# Patient Record
Sex: FEMALE | Race: WHITE | NOT HISPANIC OR LATINO | Employment: UNEMPLOYED
[De-identification: names, ages, dates, MRNs, and addresses within clinical notes are randomized per-mention and may not be internally consistent; named-entity substitution may affect disease eponyms.]

---

## 2017-02-17 ENCOUNTER — HOSPITAL (OUTPATIENT)
Dept: OTHER | Age: 37
End: 2017-02-17
Attending: INTERNAL MEDICINE

## 2017-10-24 ENCOUNTER — PRIOR ORIGINAL RECORDS (OUTPATIENT)
Dept: OTHER | Age: 37
End: 2017-10-24

## 2017-12-31 ENCOUNTER — PRIOR ORIGINAL RECORDS (OUTPATIENT)
Dept: OTHER | Age: 37
End: 2017-12-31

## 2018-03-08 ENCOUNTER — HOSPITAL (OUTPATIENT)
Dept: OTHER | Age: 38
End: 2018-03-08
Attending: INTERNAL MEDICINE

## 2018-03-09 ENCOUNTER — DIAGNOSTIC TRANS (OUTPATIENT)
Dept: OTHER | Age: 38
End: 2018-03-09

## 2018-03-23 ENCOUNTER — HOSPITAL (OUTPATIENT)
Dept: OTHER | Age: 38
End: 2018-03-23
Attending: INTERNAL MEDICINE

## 2018-03-28 ENCOUNTER — HOSPITAL (OUTPATIENT)
Dept: OTHER | Age: 38
End: 2018-03-28
Attending: INTERNAL MEDICINE

## 2018-05-31 ENCOUNTER — HOSPITAL (OUTPATIENT)
Dept: OTHER | Age: 38
End: 2018-05-31
Attending: INTERNAL MEDICINE

## 2018-06-28 ENCOUNTER — HOSPITAL (OUTPATIENT)
Dept: OTHER | Age: 38
End: 2018-06-28
Attending: INTERNAL MEDICINE

## 2018-11-13 ENCOUNTER — IMAGING SERVICES (OUTPATIENT)
Dept: OTHER | Age: 38
End: 2018-11-13

## 2018-11-13 ENCOUNTER — HOSPITAL (OUTPATIENT)
Dept: OTHER | Age: 38
End: 2018-11-13
Attending: INTERNAL MEDICINE

## 2018-12-31 ENCOUNTER — PRIOR ORIGINAL RECORDS (OUTPATIENT)
Dept: OTHER | Age: 38
End: 2018-12-31

## 2019-01-07 ENCOUNTER — HOSPITAL (OUTPATIENT)
Dept: OTHER | Age: 39
End: 2019-01-07
Attending: INTERNAL MEDICINE

## 2019-11-13 ENCOUNTER — HOSPITAL (OUTPATIENT)
Dept: OTHER | Age: 39
End: 2019-11-13
Attending: INTERNAL MEDICINE

## 2020-10-11 VITALS
WEIGHT: 144.49 LBS | DIASTOLIC BLOOD PRESSURE: 82 MMHG | BODY MASS INDEX: 24.07 KG/M2 | SYSTOLIC BLOOD PRESSURE: 126 MMHG | HEIGHT: 65 IN

## 2020-12-10 PROCEDURE — 87624 HPV HI-RISK TYP POOLED RSLT: CPT | Performed by: CLINICAL MEDICAL LABORATORY

## 2020-12-10 PROCEDURE — 88142 CYTOPATH C/V THIN LAYER: CPT | Performed by: CLINICAL MEDICAL LABORATORY

## 2020-12-11 ENCOUNTER — LAB REQUISITION (OUTPATIENT)
Dept: LAB | Age: 40
End: 2020-12-11

## 2020-12-11 DIAGNOSIS — Z12.4 ENCOUNTER FOR SCREENING FOR MALIGNANT NEOPLASM OF CERVIX: ICD-10-CM

## 2020-12-16 LAB
CASE RPRT: NORMAL
CYTOLOGY CVX/VAG STUDY: NORMAL
HPV16+18+45 E6+E7MRNA CVX NAA+PROBE: NEGATIVE
Lab: NORMAL
PAP EDUCATIONAL NOTE: NORMAL
SPECIMEN ADEQUACY: NORMAL

## 2020-12-17 ENCOUNTER — IMMUNIZATION (OUTPATIENT)
Dept: LAB | Age: 40
End: 2020-12-17

## 2020-12-17 DIAGNOSIS — Z23 NEED FOR VACCINATION: Primary | ICD-10-CM

## 2020-12-17 PROCEDURE — 0001A COVID 19 PFIZER-BIONTECH: CPT

## 2020-12-17 PROCEDURE — 91300 COVID 19 PFIZER-BIONTECH: CPT

## 2021-01-01 ENCOUNTER — EXTERNAL RECORD (OUTPATIENT)
Dept: INFUSION THERAPY | Age: 41
End: 2021-01-01

## 2021-01-06 ENCOUNTER — IMMUNIZATION (OUTPATIENT)
Dept: LAB | Age: 41
End: 2021-01-06

## 2021-01-06 DIAGNOSIS — Z23 NEED FOR VACCINATION: Primary | ICD-10-CM

## 2021-01-06 PROCEDURE — 0002A COVID 19 PFIZER-BIONTECH: CPT

## 2021-01-06 PROCEDURE — 91300 COVID 19 PFIZER-BIONTECH: CPT

## 2021-02-08 ENCOUNTER — HOSPITAL ENCOUNTER (OUTPATIENT)
Dept: LAB | Age: 41
Discharge: HOME | End: 2021-02-08

## 2021-02-08 ENCOUNTER — APPOINTMENT (OUTPATIENT)
Dept: LAB | Age: 41
End: 2021-02-08
Attending: INTERNAL MEDICINE

## 2021-02-08 DIAGNOSIS — D72.824 BASOPHILIA: ICD-10-CM

## 2021-02-08 DIAGNOSIS — B99.8 IMMUNOSUPPRESSION-RELATED INFECTIOUS DISEASE (CMD): Primary | ICD-10-CM

## 2021-02-08 DIAGNOSIS — Z57.9 OCCUPATIONAL EXPOSURE TO RISK FACTOR: ICD-10-CM

## 2021-02-08 DIAGNOSIS — D84.9 IMMUNOSUPPRESSION-RELATED INFECTIOUS DISEASE (CMD): Primary | ICD-10-CM

## 2021-02-08 DIAGNOSIS — E88.09 DYSPROTEINEMIA: ICD-10-CM

## 2021-02-08 DIAGNOSIS — E53.8 B12 DEFICIENCY: ICD-10-CM

## 2021-02-08 DIAGNOSIS — C92.10 CHRONIC MYELOID LEUKEMIA (CMD): ICD-10-CM

## 2021-02-08 LAB
ALBUMIN SERPL-MCNC: 4.1 G/DL (ref 3.6–5.1)
ALBUMIN/GLOB SERPL: 1.3 {RATIO} (ref 1–2.4)
ALP SERPL-CCNC: 54 UNITS/L (ref 45–117)
ALT SERPL-CCNC: 23 UNITS/L
ANION GAP SERPL CALC-SCNC: 8 MMOL/L (ref 10–20)
AST SERPL-CCNC: 21 UNITS/L
BASOPHILS # BLD: 0 K/MCL (ref 0–0.3)
BASOPHILS NFR BLD: 1 %
BILIRUB SERPL-MCNC: 0.4 MG/DL (ref 0.2–1)
BUN SERPL-MCNC: 11 MG/DL (ref 6–20)
BUN/CREAT SERPL: 19 (ref 7–25)
CALCIUM SERPL-MCNC: 8.5 MG/DL (ref 8.4–10.2)
CHLORIDE SERPL-SCNC: 110 MMOL/L (ref 98–107)
CO2 SERPL-SCNC: 27 MMOL/L (ref 21–32)
CREAT SERPL-MCNC: 0.59 MG/DL (ref 0.51–0.95)
DEPRECATED RDW RBC: 45.2 FL (ref 39–50)
EOSINOPHIL # BLD: 0.1 K/MCL (ref 0–0.5)
EOSINOPHIL NFR BLD: 3 %
ERYTHROCYTE [DISTWIDTH] IN BLOOD: 13 % (ref 11–15)
FASTING DURATION TIME PATIENT: 12 HOURS
GFR SERPLBLD BASED ON 1.73 SQ M-ARVRAT: >90 ML/MIN/1.73M2
GLOBULIN SER-MCNC: 3.2 G/DL (ref 2–4)
GLUCOSE SERPL-MCNC: 75 MG/DL (ref 65–99)
HCT VFR BLD CALC: 36.1 % (ref 36–46.5)
HGB BLD-MCNC: 11.9 G/DL (ref 12–15.5)
IMM GRANULOCYTES # BLD AUTO: 0 K/MCL (ref 0–0.2)
IMM GRANULOCYTES # BLD: 0 %
LYMPHOCYTES # BLD: 1.1 K/MCL (ref 1–4.8)
LYMPHOCYTES NFR BLD: 29 %
MCH RBC QN AUTO: 31 PG (ref 26–34)
MCHC RBC AUTO-ENTMCNC: 33 G/DL (ref 32–36.5)
MCV RBC AUTO: 94 FL (ref 78–100)
MONOCYTES # BLD: 0.3 K/MCL (ref 0.3–0.9)
MONOCYTES NFR BLD: 7 %
NEUTROPHILS # BLD: 2.4 K/MCL (ref 1.8–7.7)
NEUTROPHILS NFR BLD: 60 %
NRBC BLD MANUAL-RTO: 0 /100 WBC
PLATELET # BLD AUTO: 208 K/MCL (ref 140–450)
POTASSIUM SERPL-SCNC: 3.9 MMOL/L (ref 3.4–5.1)
PROT SERPL-MCNC: 7.3 G/DL (ref 6.4–8.2)
RBC # BLD: 3.84 MIL/MCL (ref 4–5.2)
SARS-COV-2 IGG SERPL QL IA: NEGATIVE
SARS-COV-2 N IGG SERPL QL IA: 0.02
SODIUM SERPL-SCNC: 141 MMOL/L (ref 135–145)
VIT B12 SERPL-MCNC: 862 PG/ML (ref 211–911)
WBC # BLD: 4 K/MCL (ref 4.2–11)

## 2021-02-08 PROCEDURE — 36415 COLL VENOUS BLD VENIPUNCTURE: CPT

## 2021-02-08 PROCEDURE — 86769 SARS-COV-2 COVID-19 ANTIBODY: CPT

## 2021-02-08 PROCEDURE — 82607 VITAMIN B-12: CPT

## 2021-02-08 PROCEDURE — 81206 BCR/ABL1 GENE MAJOR BP: CPT

## 2021-02-08 PROCEDURE — 85025 COMPLETE CBC W/AUTO DIFF WBC: CPT

## 2021-02-08 PROCEDURE — 83520 IMMUNOASSAY QUANT NOS NONAB: CPT

## 2021-02-08 PROCEDURE — 80053 COMPREHEN METABOLIC PANEL: CPT

## 2021-02-08 SDOH — HEALTH STABILITY - PHYSICAL HEALTH: OCCUPATIONAL EXPOSURE TO UNSPECIFIED RISK FACTOR: Z57.9

## 2021-02-09 LAB
BCR-ABL1, LOG REDUCTION - NUMERIC: 4.11
IGA SERPL-MCNC: 125 MG/DL (ref 82–453)
IGG SERPL-MCNC: 878 MG/DL (ref 751–1560)
IGM SERPL-MCNC: 72 MG/DL (ref 46–304)
KAPPA LC FREE SER NEPH-MCNC: 1.25 MG/DL (ref 0.33–1.94)
KAPPA LC/LAMBDA SER: 1.23 {RATIO} (ref 0.26–1.65)
LAMBDA LC FREE SER NEPH-MCNC: 1.02 MG/DL (ref 0.57–2.63)
PATH INTERP BLD-IMP: NORMAL
SERVICE CMNT-IMP: ABNORMAL
T(ABL1,BCR)P210 BLD/T QL: POSITIVE
T(ABL1,BCR)P210/CONTROL (IS) BLD/T: 0.01 %

## 2021-03-03 ENCOUNTER — OFFICE VISIT (OUTPATIENT)
Dept: HEMATOLOGY/ONCOLOGY | Age: 41
End: 2021-03-03
Attending: INTERNAL MEDICINE

## 2021-03-03 VITALS
SYSTOLIC BLOOD PRESSURE: 117 MMHG | OXYGEN SATURATION: 100 % | HEIGHT: 68 IN | HEART RATE: 76 BPM | BODY MASS INDEX: 20.34 KG/M2 | DIASTOLIC BLOOD PRESSURE: 74 MMHG | WEIGHT: 134.2 LBS

## 2021-03-03 DIAGNOSIS — D53.9 NUTRITIONAL ANEMIA, UNSPECIFIED: ICD-10-CM

## 2021-03-03 DIAGNOSIS — M04.1 FAMILIAL MEDITERRANEAN FEVER (CMD): ICD-10-CM

## 2021-03-03 DIAGNOSIS — C92.11 CML IN REMISSION (CMD): Primary | ICD-10-CM

## 2021-03-03 PROCEDURE — 99205 OFFICE O/P NEW HI 60 MIN: CPT | Performed by: INTERNAL MEDICINE

## 2021-03-03 SDOH — HEALTH STABILITY: PHYSICAL HEALTH: ON AVERAGE, HOW MANY DAYS PER WEEK DO YOU ENGAGE IN MODERATE TO STRENUOUS EXERCISE (LIKE A BRISK WALK)?: 0 DAYS

## 2021-03-03 SDOH — HEALTH STABILITY: PHYSICAL HEALTH: ON AVERAGE, HOW MANY MINUTES DO YOU ENGAGE IN EXERCISE AT THIS LEVEL?: 0 MIN

## 2021-03-03 ASSESSMENT — ENCOUNTER SYMPTOMS
BLOOD IN STOOL: 0
CONFUSION: 0
FEVER: 1
ADENOPATHY: 0
DIARRHEA: 0
WEAKNESS: 1
BRUISES/BLEEDS EASILY: 0
CHILLS: 0
NAUSEA: 0
UNEXPECTED WEIGHT CHANGE: 0
APPETITE CHANGE: 0
TROUBLE SWALLOWING: 0
APNEA: 0
VOICE CHANGE: 0
ACTIVITY CHANGE: 1
ABDOMINAL DISTENTION: 0
ABDOMINAL PAIN: 0
SHORTNESS OF BREATH: 0
LIGHT-HEADEDNESS: 0
DIZZINESS: 0
COUGH: 0
CONSTIPATION: 0
DIAPHORESIS: 0
CHOKING: 0
SLEEP DISTURBANCE: 0
CHEST TIGHTNESS: 0
WHEEZING: 0
VOMITING: 0
BACK PAIN: 1
SPEECH DIFFICULTY: 0
FATIGUE: 1
HEADACHES: 0

## 2021-03-03 ASSESSMENT — PATIENT HEALTH QUESTIONNAIRE - PHQ9
SUM OF ALL RESPONSES TO PHQ9 QUESTIONS 1 AND 2: 0
SUM OF ALL RESPONSES TO PHQ9 QUESTIONS 1 AND 2: 0
2. FEELING DOWN, DEPRESSED OR HOPELESS: NOT AT ALL
CLINICAL INTERPRETATION OF PHQ9 SCORE: NO FURTHER SCREENING NEEDED
CLINICAL INTERPRETATION OF PHQ2 SCORE: NO FURTHER SCREENING NEEDED
1. LITTLE INTEREST OR PLEASURE IN DOING THINGS: NOT AT ALL

## 2021-03-04 ENCOUNTER — HOSPITAL ENCOUNTER (OUTPATIENT)
Dept: LAB | Age: 41
Discharge: HOME | End: 2021-03-04
Attending: INTERNAL MEDICINE

## 2021-03-04 DIAGNOSIS — C92.11 CML IN REMISSION (CMD): ICD-10-CM

## 2021-03-04 DIAGNOSIS — D53.9 NUTRITIONAL ANEMIA, UNSPECIFIED: ICD-10-CM

## 2021-03-04 DIAGNOSIS — M04.1 FAMILIAL MEDITERRANEAN FEVER (CMD): ICD-10-CM

## 2021-03-04 LAB
ALBUMIN SERPL-MCNC: 4.1 G/DL (ref 3.6–5.1)
ALBUMIN/GLOB SERPL: 1.2 {RATIO} (ref 1–2.4)
ALP SERPL-CCNC: 52 UNITS/L (ref 45–117)
ALT SERPL-CCNC: 27 UNITS/L
ANION GAP SERPL CALC-SCNC: 8 MMOL/L (ref 10–20)
AST SERPL-CCNC: 20 UNITS/L
BASOPHILS # BLD: 0 K/MCL (ref 0–0.3)
BASOPHILS NFR BLD: 1 %
BILIRUB SERPL-MCNC: 0.3 MG/DL (ref 0.2–1)
BUN SERPL-MCNC: 12 MG/DL (ref 6–20)
BUN/CREAT SERPL: 15 (ref 7–25)
CALCIUM SERPL-MCNC: 9 MG/DL (ref 8.4–10.2)
CHLORIDE SERPL-SCNC: 110 MMOL/L (ref 98–107)
CO2 SERPL-SCNC: 25 MMOL/L (ref 21–32)
CREAT SERPL-MCNC: 0.81 MG/DL (ref 0.51–0.95)
DEPRECATED RDW RBC: 44.5 FL (ref 39–50)
EOSINOPHIL # BLD: 0.1 K/MCL (ref 0–0.5)
EOSINOPHIL NFR BLD: 1 %
ERYTHROCYTE [DISTWIDTH] IN BLOOD: 12.9 % (ref 11–15)
ERYTHROCYTE [SEDIMENTATION RATE] IN BLOOD BY WESTERGREN METHOD: 8 MM/HR (ref 0–20)
FASTING DURATION TIME PATIENT: ABNORMAL H
FERRITIN SERPL-MCNC: 26 NG/ML (ref 8–252)
FOLATE SERPL-MCNC: 8.8 NG/ML
GFR SERPLBLD BASED ON 1.73 SQ M-ARVRAT: 90 ML/MIN/1.73M2
GLOBULIN SER-MCNC: 3.3 G/DL (ref 2–4)
GLUCOSE SERPL-MCNC: 94 MG/DL (ref 65–99)
HCT VFR BLD CALC: 34.8 % (ref 36–46.5)
HGB BLD-MCNC: 11.7 G/DL (ref 12–15.5)
IMM GRANULOCYTES # BLD AUTO: 0 K/MCL (ref 0–0.2)
IMM GRANULOCYTES # BLD: 0 %
IRON SATN MFR SERPL: 19 % (ref 15–45)
IRON SERPL-MCNC: 66 MCG/DL (ref 50–170)
LYMPHOCYTES # BLD: 1.2 K/MCL (ref 1–4.8)
LYMPHOCYTES NFR BLD: 28 %
MCH RBC QN AUTO: 31.3 PG (ref 26–34)
MCHC RBC AUTO-ENTMCNC: 33.6 G/DL (ref 32–36.5)
MCV RBC AUTO: 93 FL (ref 78–100)
MONOCYTES # BLD: 0.3 K/MCL (ref 0.3–0.9)
MONOCYTES NFR BLD: 6 %
NEUTROPHILS # BLD: 2.8 K/MCL (ref 1.8–7.7)
NEUTROPHILS NFR BLD: 64 %
NRBC BLD MANUAL-RTO: 0 /100 WBC
PLATELET # BLD AUTO: 196 K/MCL (ref 140–450)
POTASSIUM SERPL-SCNC: 3.7 MMOL/L (ref 3.4–5.1)
PROT SERPL-MCNC: 7.4 G/DL (ref 6.4–8.2)
RBC # BLD: 3.74 MIL/MCL (ref 4–5.2)
SODIUM SERPL-SCNC: 139 MMOL/L (ref 135–145)
TIBC SERPL-MCNC: 340 MCG/DL (ref 250–450)
VIT B12 SERPL-MCNC: 935 PG/ML (ref 211–911)
WBC # BLD: 4.4 K/MCL (ref 4.2–11)

## 2021-03-04 PROCEDURE — 83540 ASSAY OF IRON: CPT | Performed by: INTERNAL MEDICINE

## 2021-03-04 PROCEDURE — 82728 ASSAY OF FERRITIN: CPT | Performed by: INTERNAL MEDICINE

## 2021-03-04 PROCEDURE — 82746 ASSAY OF FOLIC ACID SERUM: CPT | Performed by: INTERNAL MEDICINE

## 2021-03-04 PROCEDURE — 36415 COLL VENOUS BLD VENIPUNCTURE: CPT | Performed by: INTERNAL MEDICINE

## 2021-03-04 PROCEDURE — 85652 RBC SED RATE AUTOMATED: CPT | Performed by: INTERNAL MEDICINE

## 2021-03-04 PROCEDURE — 80053 COMPREHEN METABOLIC PANEL: CPT | Performed by: INTERNAL MEDICINE

## 2021-03-04 PROCEDURE — 85025 COMPLETE CBC W/AUTO DIFF WBC: CPT | Performed by: INTERNAL MEDICINE

## 2021-03-04 PROCEDURE — 81206 BCR/ABL1 GENE MAJOR BP: CPT | Performed by: INTERNAL MEDICINE

## 2021-03-10 LAB
BCR-ABL1, LOG REDUCTION - NUMERIC: 3.64
SERVICE CMNT-IMP: ABNORMAL
T(ABL1,BCR)P210 BLD/T QL: POSITIVE
T(ABL1,BCR)P210/CONTROL (IS) BLD/T: 0.02 %

## 2021-04-03 ENCOUNTER — TELEPHONE (OUTPATIENT)
Dept: RHEUMATOLOGY | Age: 41
End: 2021-04-03

## 2021-04-05 ENCOUNTER — TELEPHONE (OUTPATIENT)
Dept: RHEUMATOLOGY | Age: 41
End: 2021-04-05

## 2021-04-08 ENCOUNTER — APPOINTMENT (OUTPATIENT)
Dept: RHEUMATOLOGY | Age: 41
End: 2021-04-08

## 2021-04-09 ENCOUNTER — OFFICE VISIT (OUTPATIENT)
Dept: HEMATOLOGY/ONCOLOGY | Age: 41
End: 2021-04-09
Attending: INTERNAL MEDICINE

## 2021-04-09 ENCOUNTER — HOSPITAL ENCOUNTER (OUTPATIENT)
Dept: LAB | Age: 41
Discharge: STILL ADMITTED OR ADMINISTRATIVE | End: 2021-04-09
Attending: INTERNAL MEDICINE

## 2021-04-09 VITALS
HEIGHT: 67 IN | SYSTOLIC BLOOD PRESSURE: 106 MMHG | RESPIRATION RATE: 18 BRPM | HEART RATE: 73 BPM | BODY MASS INDEX: 20.88 KG/M2 | WEIGHT: 133 LBS | DIASTOLIC BLOOD PRESSURE: 57 MMHG | OXYGEN SATURATION: 100 % | TEMPERATURE: 98 F

## 2021-04-09 DIAGNOSIS — D53.9 NUTRITIONAL ANEMIA, UNSPECIFIED: ICD-10-CM

## 2021-04-09 DIAGNOSIS — M04.1 FAMILIAL MEDITERRANEAN FEVER (CMD): ICD-10-CM

## 2021-04-09 DIAGNOSIS — C92.11 CML IN REMISSION (CMD): Primary | ICD-10-CM

## 2021-04-09 DIAGNOSIS — C92.11 CML IN REMISSION (CMD): ICD-10-CM

## 2021-04-09 LAB
ALBUMIN SERPL-MCNC: 4 G/DL (ref 3.6–5.1)
ALBUMIN/GLOB SERPL: 1.3 {RATIO} (ref 1–2.4)
ALP SERPL-CCNC: 52 UNITS/L (ref 45–117)
ALT SERPL-CCNC: 27 UNITS/L
ANION GAP SERPL CALC-SCNC: 7 MMOL/L (ref 10–20)
AST SERPL-CCNC: 23 UNITS/L
BASOPHILS # BLD: 0 K/MCL (ref 0–0.3)
BASOPHILS NFR BLD: 0 %
BILIRUB SERPL-MCNC: 0.4 MG/DL (ref 0.2–1)
BUN SERPL-MCNC: 14 MG/DL (ref 6–20)
BUN/CREAT SERPL: 23 (ref 7–25)
CALCIUM SERPL-MCNC: 8.5 MG/DL (ref 8.4–10.2)
CHLORIDE SERPL-SCNC: 110 MMOL/L (ref 98–107)
CO2 SERPL-SCNC: 27 MMOL/L (ref 21–32)
CREAT SERPL-MCNC: 0.61 MG/DL (ref 0.51–0.95)
DEPRECATED RDW RBC: 45.3 FL (ref 39–50)
EOSINOPHIL # BLD: 0.1 K/MCL (ref 0–0.5)
EOSINOPHIL NFR BLD: 2 %
ERYTHROCYTE [DISTWIDTH] IN BLOOD: 13.1 % (ref 11–15)
FASTING DURATION TIME PATIENT: ABNORMAL H
GFR SERPLBLD BASED ON 1.73 SQ M-ARVRAT: >90 ML/MIN/1.73M2
GLOBULIN SER-MCNC: 3 G/DL (ref 2–4)
GLUCOSE SERPL-MCNC: 84 MG/DL (ref 65–99)
HCT VFR BLD CALC: 33.3 % (ref 36–46.5)
HGB BLD-MCNC: 10.9 G/DL (ref 12–15.5)
IMM GRANULOCYTES # BLD AUTO: 0 K/MCL (ref 0–0.2)
IMM GRANULOCYTES # BLD: 0 %
LYMPHOCYTES # BLD: 1 K/MCL (ref 1–4.8)
LYMPHOCYTES NFR BLD: 25 %
MCH RBC QN AUTO: 30.7 PG (ref 26–34)
MCHC RBC AUTO-ENTMCNC: 32.7 G/DL (ref 32–36.5)
MCV RBC AUTO: 93.8 FL (ref 78–100)
MONOCYTES # BLD: 0.3 K/MCL (ref 0.3–0.9)
MONOCYTES NFR BLD: 7 %
NEUTROPHILS # BLD: 2.5 K/MCL (ref 1.8–7.7)
NEUTROPHILS NFR BLD: 66 %
NRBC BLD MANUAL-RTO: 0 /100 WBC
PLATELET # BLD AUTO: 211 K/MCL (ref 140–450)
POTASSIUM SERPL-SCNC: 3.8 MMOL/L (ref 3.4–5.1)
PROT SERPL-MCNC: 7 G/DL (ref 6.4–8.2)
RAINBOW EXTRA TUBES HOLD SPECIMEN: NORMAL
RBC # BLD: 3.55 MIL/MCL (ref 4–5.2)
SODIUM SERPL-SCNC: 140 MMOL/L (ref 135–145)
WBC # BLD: 3.9 K/MCL (ref 4.2–11)

## 2021-04-09 PROCEDURE — 99214 OFFICE O/P EST MOD 30 MIN: CPT | Performed by: INTERNAL MEDICINE

## 2021-04-09 PROCEDURE — 80053 COMPREHEN METABOLIC PANEL: CPT | Performed by: INTERNAL MEDICINE

## 2021-04-09 PROCEDURE — 36415 COLL VENOUS BLD VENIPUNCTURE: CPT | Performed by: INTERNAL MEDICINE

## 2021-04-09 PROCEDURE — 81206 BCR/ABL1 GENE MAJOR BP: CPT | Performed by: INTERNAL MEDICINE

## 2021-04-09 PROCEDURE — 85025 COMPLETE CBC W/AUTO DIFF WBC: CPT | Performed by: INTERNAL MEDICINE

## 2021-04-09 ASSESSMENT — ENCOUNTER SYMPTOMS
HEADACHES: 0
FATIGUE: 0
ABDOMINAL PAIN: 0
CONFUSION: 0
CHILLS: 0
SPEECH DIFFICULTY: 0
ADENOPATHY: 0
COUGH: 0
VOICE CHANGE: 0
SLEEP DISTURBANCE: 0
ACTIVITY CHANGE: 0
BRUISES/BLEEDS EASILY: 0
WHEEZING: 0
ABDOMINAL DISTENTION: 0
APPETITE CHANGE: 0
CONSTIPATION: 0
VOMITING: 0
TROUBLE SWALLOWING: 0
FEVER: 0
LIGHT-HEADEDNESS: 0
BLOOD IN STOOL: 0
CHEST TIGHTNESS: 0
APNEA: 0
BACK PAIN: 0
CHOKING: 0
UNEXPECTED WEIGHT CHANGE: 0
DIARRHEA: 0
DIAPHORESIS: 0
WEAKNESS: 0
DIZZINESS: 0
SHORTNESS OF BREATH: 0
NAUSEA: 0

## 2021-04-09 ASSESSMENT — PATIENT HEALTH QUESTIONNAIRE - PHQ9
SUM OF ALL RESPONSES TO PHQ9 QUESTIONS 1 AND 2: 0
SUM OF ALL RESPONSES TO PHQ9 QUESTIONS 1 AND 2: 0
1. LITTLE INTEREST OR PLEASURE IN DOING THINGS: NOT AT ALL
2. FEELING DOWN, DEPRESSED OR HOPELESS: NOT AT ALL
CLINICAL INTERPRETATION OF PHQ9 SCORE: NO FURTHER SCREENING NEEDED
CLINICAL INTERPRETATION OF PHQ2 SCORE: NO FURTHER SCREENING NEEDED

## 2021-04-12 LAB
BCR-ABL1, LOG REDUCTION - NUMERIC: 3.79
SERVICE CMNT-IMP: ABNORMAL
T(ABL1,BCR)P210 BLD/T QL: POSITIVE
T(ABL1,BCR)P210/CONTROL (IS) BLD/T: 0.02 %

## 2021-05-06 ENCOUNTER — TELEPHONE (OUTPATIENT)
Dept: HEMATOLOGY/ONCOLOGY | Age: 41
End: 2021-05-06

## 2021-05-06 DIAGNOSIS — C92.11 CML IN REMISSION (CMD): Primary | ICD-10-CM

## 2021-05-06 RX ORDER — IMATINIB MESYLATE 400 MG/1
400 TABLET, FILM COATED ORAL DAILY
Qty: 30 TABLET | Refills: 1 | Status: SHIPPED | OUTPATIENT
Start: 2021-05-06 | End: 2021-07-06

## 2021-05-10 ENCOUNTER — HOSPITAL ENCOUNTER (OUTPATIENT)
Dept: LAB | Age: 41
Discharge: STILL ADMITTED OR ADMINISTRATIVE | End: 2021-05-10
Attending: INTERNAL MEDICINE

## 2021-05-10 ENCOUNTER — OFFICE VISIT (OUTPATIENT)
Dept: HEMATOLOGY/ONCOLOGY | Age: 41
End: 2021-05-10
Attending: INTERNAL MEDICINE

## 2021-05-10 VITALS
HEIGHT: 67 IN | TEMPERATURE: 98 F | BODY MASS INDEX: 20.56 KG/M2 | DIASTOLIC BLOOD PRESSURE: 65 MMHG | RESPIRATION RATE: 18 BRPM | WEIGHT: 131 LBS | HEART RATE: 74 BPM | SYSTOLIC BLOOD PRESSURE: 111 MMHG | OXYGEN SATURATION: 100 %

## 2021-05-10 DIAGNOSIS — C92.11 CML IN REMISSION (CMD): Primary | ICD-10-CM

## 2021-05-10 DIAGNOSIS — C92.11 CML IN REMISSION (CMD): ICD-10-CM

## 2021-05-10 DIAGNOSIS — M25.552 ACUTE HIP PAIN, LEFT: ICD-10-CM

## 2021-05-10 LAB
ALBUMIN SERPL-MCNC: 4.2 G/DL (ref 3.6–5.1)
ALBUMIN/GLOB SERPL: 1.4 {RATIO} (ref 1–2.4)
ALP SERPL-CCNC: 57 UNITS/L (ref 45–117)
ALT SERPL-CCNC: 27 UNITS/L
ANION GAP SERPL CALC-SCNC: 7 MMOL/L (ref 10–20)
AST SERPL-CCNC: 23 UNITS/L
BASOPHILS # BLD: 0 K/MCL (ref 0–0.3)
BASOPHILS NFR BLD: 1 %
BILIRUB SERPL-MCNC: 0.4 MG/DL (ref 0.2–1)
BUN SERPL-MCNC: 9 MG/DL (ref 6–20)
BUN/CREAT SERPL: 14 (ref 7–25)
CALCIUM SERPL-MCNC: 8.9 MG/DL (ref 8.4–10.2)
CHLORIDE SERPL-SCNC: 111 MMOL/L (ref 98–107)
CO2 SERPL-SCNC: 27 MMOL/L (ref 21–32)
CREAT SERPL-MCNC: 0.66 MG/DL (ref 0.51–0.95)
DEPRECATED RDW RBC: 45.1 FL (ref 39–50)
EOSINOPHIL # BLD: 0.1 K/MCL (ref 0–0.5)
EOSINOPHIL NFR BLD: 2 %
ERYTHROCYTE [DISTWIDTH] IN BLOOD: 13.2 % (ref 11–15)
FASTING DURATION TIME PATIENT: ABNORMAL H
GFR SERPLBLD BASED ON 1.73 SQ M-ARVRAT: >90 ML/MIN/1.73M2
GLOBULIN SER-MCNC: 3 G/DL (ref 2–4)
GLUCOSE SERPL-MCNC: 94 MG/DL (ref 65–99)
HCT VFR BLD CALC: 35.9 % (ref 36–46.5)
HGB BLD-MCNC: 11.7 G/DL (ref 12–15.5)
IMM GRANULOCYTES # BLD AUTO: 0 K/MCL (ref 0–0.2)
IMM GRANULOCYTES # BLD: 0 %
LYMPHOCYTES # BLD: 1.2 K/MCL (ref 1–4.8)
LYMPHOCYTES NFR BLD: 35 %
MCH RBC QN AUTO: 30.8 PG (ref 26–34)
MCHC RBC AUTO-ENTMCNC: 32.6 G/DL (ref 32–36.5)
MCV RBC AUTO: 94.5 FL (ref 78–100)
MONOCYTES # BLD: 0.2 K/MCL (ref 0.3–0.9)
MONOCYTES NFR BLD: 7 %
NEUTROPHILS # BLD: 1.8 K/MCL (ref 1.8–7.7)
NEUTROPHILS NFR BLD: 55 %
NRBC BLD MANUAL-RTO: 0 /100 WBC
PLATELET # BLD AUTO: 205 K/MCL (ref 140–450)
POTASSIUM SERPL-SCNC: 3.9 MMOL/L (ref 3.4–5.1)
PROT SERPL-MCNC: 7.2 G/DL (ref 6.4–8.2)
RBC # BLD: 3.8 MIL/MCL (ref 4–5.2)
SODIUM SERPL-SCNC: 141 MMOL/L (ref 135–145)
WBC # BLD: 3.3 K/MCL (ref 4.2–11)

## 2021-05-10 PROCEDURE — 99214 OFFICE O/P EST MOD 30 MIN: CPT | Performed by: INTERNAL MEDICINE

## 2021-05-10 PROCEDURE — 36415 COLL VENOUS BLD VENIPUNCTURE: CPT | Performed by: INTERNAL MEDICINE

## 2021-05-10 PROCEDURE — 81206 BCR/ABL1 GENE MAJOR BP: CPT | Performed by: INTERNAL MEDICINE

## 2021-05-10 PROCEDURE — 80053 COMPREHEN METABOLIC PANEL: CPT | Performed by: INTERNAL MEDICINE

## 2021-05-10 PROCEDURE — 85025 COMPLETE CBC W/AUTO DIFF WBC: CPT | Performed by: INTERNAL MEDICINE

## 2021-05-10 ASSESSMENT — ENCOUNTER SYMPTOMS
SLEEP DISTURBANCE: 0
CHOKING: 0
BACK PAIN: 0
LIGHT-HEADEDNESS: 0
ABDOMINAL PAIN: 0
DIARRHEA: 0
TROUBLE SWALLOWING: 0
WHEEZING: 0
BLOOD IN STOOL: 0
SHORTNESS OF BREATH: 0
UNEXPECTED WEIGHT CHANGE: 0
ABDOMINAL DISTENTION: 0
SPEECH DIFFICULTY: 0
CONSTIPATION: 0
ADENOPATHY: 0
VOMITING: 0
COUGH: 0
FEVER: 0
HEADACHES: 0
WEAKNESS: 0
DIAPHORESIS: 0
ACTIVITY CHANGE: 0
FATIGUE: 0
CHEST TIGHTNESS: 0
NAUSEA: 0
CHILLS: 0
APNEA: 0
VOICE CHANGE: 0
CONFUSION: 0
APPETITE CHANGE: 0
BRUISES/BLEEDS EASILY: 0
DIZZINESS: 0

## 2021-05-14 LAB
BCR-ABL1, LOG REDUCTION - NUMERIC: 4.05
SERVICE CMNT-IMP: ABNORMAL
T(ABL1,BCR)P210 BLD/T QL: POSITIVE
T(ABL1,BCR)P210/CONTROL (IS) BLD/T: 0.01 %

## 2021-07-02 ENCOUNTER — HOSPITAL ENCOUNTER (OUTPATIENT)
Dept: MRI IMAGING | Age: 41
Discharge: HOME | End: 2021-07-02
Attending: INTERNAL MEDICINE

## 2021-07-02 DIAGNOSIS — M25.552 ACUTE HIP PAIN, LEFT: ICD-10-CM

## 2021-07-02 PROCEDURE — 73723 MRI JOINT LWR EXTR W/O&W/DYE: CPT

## 2021-07-02 PROCEDURE — 10002805 HB CONTRAST AGENT: Performed by: INTERNAL MEDICINE

## 2021-07-02 PROCEDURE — A9585 GADOBUTROL INJECTION: HCPCS | Performed by: INTERNAL MEDICINE

## 2021-07-02 RX ORDER — GADOBUTROL 604.72 MG/ML
7.5 INJECTION INTRAVENOUS ONCE
Status: COMPLETED | OUTPATIENT
Start: 2021-07-02 | End: 2021-07-02

## 2021-07-02 RX ADMIN — GADOBUTROL 7.5 ML: 604.72 INJECTION INTRAVENOUS at 10:46

## 2021-07-06 DIAGNOSIS — C92.11 CML IN REMISSION (CMD): ICD-10-CM

## 2021-07-06 RX ORDER — IMATINIB MESYLATE 400 MG/1
TABLET, FILM COATED ORAL
Qty: 30 TABLET | Refills: 0 | Status: SHIPPED | OUTPATIENT
Start: 2021-07-06 | End: 2022-01-28

## 2021-07-12 ENCOUNTER — OFFICE VISIT (OUTPATIENT)
Dept: HEMATOLOGY/ONCOLOGY | Age: 41
End: 2021-07-12
Attending: INTERNAL MEDICINE

## 2021-07-12 ENCOUNTER — HOSPITAL ENCOUNTER (OUTPATIENT)
Dept: LAB | Age: 41
Discharge: STILL ADMITTED OR ADMINISTRATIVE | End: 2021-07-12
Attending: INTERNAL MEDICINE

## 2021-07-12 VITALS
DIASTOLIC BLOOD PRESSURE: 70 MMHG | OXYGEN SATURATION: 98 % | SYSTOLIC BLOOD PRESSURE: 114 MMHG | HEART RATE: 68 BPM | WEIGHT: 135 LBS | BODY MASS INDEX: 21.14 KG/M2

## 2021-07-12 DIAGNOSIS — C92.11 CML IN REMISSION (CMD): Primary | ICD-10-CM

## 2021-07-12 DIAGNOSIS — C92.11 CML IN REMISSION (CMD): ICD-10-CM

## 2021-07-12 LAB
ALBUMIN SERPL-MCNC: 3.8 G/DL (ref 3.6–5.1)
ALBUMIN/GLOB SERPL: 1.3 {RATIO} (ref 1–2.4)
ALP SERPL-CCNC: 57 UNITS/L (ref 45–117)
ALT SERPL-CCNC: 28 UNITS/L
ANION GAP SERPL CALC-SCNC: 8 MMOL/L (ref 10–20)
AST SERPL-CCNC: 27 UNITS/L
BASOPHILS # BLD: 0 K/MCL (ref 0–0.3)
BASOPHILS NFR BLD: 1 %
BILIRUB SERPL-MCNC: 0.4 MG/DL (ref 0.2–1)
BUN SERPL-MCNC: 11 MG/DL (ref 6–20)
BUN/CREAT SERPL: 14 (ref 7–25)
CALCIUM SERPL-MCNC: 8.7 MG/DL (ref 8.4–10.2)
CHLORIDE SERPL-SCNC: 108 MMOL/L (ref 98–107)
CO2 SERPL-SCNC: 27 MMOL/L (ref 21–32)
CREAT SERPL-MCNC: 0.77 MG/DL (ref 0.51–0.95)
DEPRECATED RDW RBC: 45.6 FL (ref 39–50)
EOSINOPHIL # BLD: 0.1 K/MCL (ref 0–0.5)
EOSINOPHIL NFR BLD: 1 %
ERYTHROCYTE [DISTWIDTH] IN BLOOD: 13.2 % (ref 11–15)
FASTING DURATION TIME PATIENT: ABNORMAL H
GFR SERPLBLD BASED ON 1.73 SQ M-ARVRAT: >90 ML/MIN/1.73M2
GLOBULIN SER-MCNC: 3 G/DL (ref 2–4)
GLUCOSE SERPL-MCNC: 99 MG/DL (ref 65–99)
HCT VFR BLD CALC: 33.8 % (ref 36–46.5)
HGB BLD-MCNC: 11 G/DL (ref 12–15.5)
IMM GRANULOCYTES # BLD AUTO: 0 K/MCL (ref 0–0.2)
IMM GRANULOCYTES # BLD: 0 %
LYMPHOCYTES # BLD: 1.1 K/MCL (ref 1–4.8)
LYMPHOCYTES NFR BLD: 18 %
MCH RBC QN AUTO: 30.8 PG (ref 26–34)
MCHC RBC AUTO-ENTMCNC: 32.5 G/DL (ref 32–36.5)
MCV RBC AUTO: 94.7 FL (ref 78–100)
MONOCYTES # BLD: 0.3 K/MCL (ref 0.3–0.9)
MONOCYTES NFR BLD: 5 %
NEUTROPHILS # BLD: 4.6 K/MCL (ref 1.8–7.7)
NEUTROPHILS NFR BLD: 75 %
NRBC BLD MANUAL-RTO: 0 /100 WBC
PLATELET # BLD AUTO: 174 K/MCL (ref 140–450)
POTASSIUM SERPL-SCNC: 4.1 MMOL/L (ref 3.4–5.1)
PROT SERPL-MCNC: 6.8 G/DL (ref 6.4–8.2)
RBC # BLD: 3.57 MIL/MCL (ref 4–5.2)
SODIUM SERPL-SCNC: 139 MMOL/L (ref 135–145)
WBC # BLD: 6.1 K/MCL (ref 4.2–11)

## 2021-07-12 PROCEDURE — 36415 COLL VENOUS BLD VENIPUNCTURE: CPT | Performed by: INTERNAL MEDICINE

## 2021-07-12 PROCEDURE — 85025 COMPLETE CBC W/AUTO DIFF WBC: CPT | Performed by: INTERNAL MEDICINE

## 2021-07-12 PROCEDURE — 99214 OFFICE O/P EST MOD 30 MIN: CPT | Performed by: INTERNAL MEDICINE

## 2021-07-12 PROCEDURE — 80053 COMPREHEN METABOLIC PANEL: CPT | Performed by: INTERNAL MEDICINE

## 2021-07-12 PROCEDURE — 81206 BCR/ABL1 GENE MAJOR BP: CPT | Performed by: INTERNAL MEDICINE

## 2021-07-12 ASSESSMENT — ENCOUNTER SYMPTOMS
DIARRHEA: 0
BLOOD IN STOOL: 0
SHORTNESS OF BREATH: 0
WEAKNESS: 0
APNEA: 0
ABDOMINAL DISTENTION: 0
BRUISES/BLEEDS EASILY: 0
LIGHT-HEADEDNESS: 0
VOICE CHANGE: 0
TROUBLE SWALLOWING: 0
CHEST TIGHTNESS: 0
DIAPHORESIS: 0
SPEECH DIFFICULTY: 0
UNEXPECTED WEIGHT CHANGE: 0
CONSTIPATION: 0
HEADACHES: 0
NAUSEA: 0
COUGH: 0
CHOKING: 0
CHILLS: 0
FEVER: 0
ACTIVITY CHANGE: 0
WHEEZING: 0
FATIGUE: 0
APPETITE CHANGE: 0
CONFUSION: 0
SLEEP DISTURBANCE: 0
VOMITING: 0
ABDOMINAL PAIN: 0
DIZZINESS: 0
BACK PAIN: 0
ADENOPATHY: 0

## 2021-07-12 ASSESSMENT — PATIENT HEALTH QUESTIONNAIRE - PHQ9
SUM OF ALL RESPONSES TO PHQ9 QUESTIONS 1 AND 2: 0
CLINICAL INTERPRETATION OF PHQ9 SCORE: NO FURTHER SCREENING NEEDED
2. FEELING DOWN, DEPRESSED OR HOPELESS: NOT AT ALL
CLINICAL INTERPRETATION OF PHQ2 SCORE: NO FURTHER SCREENING NEEDED
SUM OF ALL RESPONSES TO PHQ9 QUESTIONS 1 AND 2: 0
1. LITTLE INTEREST OR PLEASURE IN DOING THINGS: NOT AT ALL

## 2021-07-15 LAB
BCR-ABL1, LOG REDUCTION - NUMERIC: 3.92
SERVICE CMNT-IMP: ABNORMAL
T(ABL1,BCR)P210 BLD/T QL: POSITIVE
T(ABL1,BCR)P210/CONTROL (IS) BLD/T: 0.01 %

## 2021-07-21 ENCOUNTER — IMAGING SERVICES (OUTPATIENT)
Dept: OTHER | Age: 41
End: 2021-07-21

## 2021-07-23 ENCOUNTER — TELEPHONE (OUTPATIENT)
Dept: HEMATOLOGY/ONCOLOGY | Age: 41
End: 2021-07-23

## 2021-08-11 ENCOUNTER — IMAGING SERVICES (OUTPATIENT)
Dept: OTHER | Age: 41
End: 2021-08-11

## 2021-08-20 ENCOUNTER — TELEPHONE (OUTPATIENT)
Dept: HEMATOLOGY/ONCOLOGY | Age: 41
End: 2021-08-20

## 2021-08-23 ENCOUNTER — OFFICE VISIT (OUTPATIENT)
Dept: HEMATOLOGY/ONCOLOGY | Age: 41
End: 2021-08-23
Attending: INTERNAL MEDICINE

## 2021-08-23 ENCOUNTER — HOSPITAL ENCOUNTER (OUTPATIENT)
Dept: LAB | Age: 41
Discharge: STILL ADMITTED OR ADMINISTRATIVE | End: 2021-08-23
Attending: INTERNAL MEDICINE

## 2021-08-23 VITALS
DIASTOLIC BLOOD PRESSURE: 66 MMHG | HEART RATE: 65 BPM | BODY MASS INDEX: 21.03 KG/M2 | HEIGHT: 67 IN | OXYGEN SATURATION: 100 % | WEIGHT: 134 LBS | SYSTOLIC BLOOD PRESSURE: 115 MMHG

## 2021-08-23 DIAGNOSIS — C92.11 CML IN REMISSION (CMD): ICD-10-CM

## 2021-08-23 DIAGNOSIS — R22.1 NECK MASS: ICD-10-CM

## 2021-08-23 DIAGNOSIS — J02.9 SORE THROAT: ICD-10-CM

## 2021-08-23 DIAGNOSIS — C92.11 CML IN REMISSION (CMD): Primary | ICD-10-CM

## 2021-08-23 LAB
ALBUMIN SERPL-MCNC: 3.9 G/DL (ref 3.6–5.1)
ALBUMIN/GLOB SERPL: 1.3 {RATIO} (ref 1–2.4)
ALP SERPL-CCNC: 54 UNITS/L (ref 45–117)
ALT SERPL-CCNC: 33 UNITS/L
ANION GAP SERPL CALC-SCNC: 8 MMOL/L (ref 10–20)
AST SERPL-CCNC: 26 UNITS/L
BASOPHILS # BLD: 0 K/MCL (ref 0–0.3)
BASOPHILS NFR BLD: 1 %
BILIRUB SERPL-MCNC: 0.4 MG/DL (ref 0.2–1)
BUN SERPL-MCNC: 11 MG/DL (ref 6–20)
BUN/CREAT SERPL: 16 (ref 7–25)
CALCIUM SERPL-MCNC: 8.3 MG/DL (ref 8.4–10.2)
CHLORIDE SERPL-SCNC: 110 MMOL/L (ref 98–107)
CO2 SERPL-SCNC: 26 MMOL/L (ref 21–32)
CREAT SERPL-MCNC: 0.69 MG/DL (ref 0.51–0.95)
DEPRECATED RDW RBC: 44.7 FL (ref 39–50)
EOSINOPHIL # BLD: 0.1 K/MCL (ref 0–0.5)
EOSINOPHIL NFR BLD: 2 %
ERYTHROCYTE [DISTWIDTH] IN BLOOD: 12.9 % (ref 11–15)
FASTING DURATION TIME PATIENT: ABNORMAL H
GFR SERPLBLD BASED ON 1.73 SQ M-ARVRAT: >90 ML/MIN/1.73M2
GLOBULIN SER-MCNC: 3 G/DL (ref 2–4)
GLUCOSE SERPL-MCNC: 85 MG/DL (ref 65–99)
HCT VFR BLD CALC: 32.7 % (ref 36–46.5)
HGB BLD-MCNC: 10.8 G/DL (ref 12–15.5)
IMM GRANULOCYTES # BLD AUTO: 0 K/MCL (ref 0–0.2)
IMM GRANULOCYTES # BLD: 0 %
LDH SERPL L TO P-CCNC: 233 UNITS/L (ref 82–240)
LYMPHOCYTES # BLD: 1.2 K/MCL (ref 1–4.8)
LYMPHOCYTES NFR BLD: 36 %
MCH RBC QN AUTO: 31.2 PG (ref 26–34)
MCHC RBC AUTO-ENTMCNC: 33 G/DL (ref 32–36.5)
MCV RBC AUTO: 94.5 FL (ref 78–100)
MONOCYTES # BLD: 0.3 K/MCL (ref 0.3–0.9)
MONOCYTES NFR BLD: 8 %
NEUTROPHILS # BLD: 1.8 K/MCL (ref 1.8–7.7)
NEUTROPHILS NFR BLD: 53 %
NRBC BLD MANUAL-RTO: 0 /100 WBC
PLATELET # BLD AUTO: 207 K/MCL (ref 140–450)
POTASSIUM SERPL-SCNC: 3.9 MMOL/L (ref 3.4–5.1)
PROT SERPL-MCNC: 6.9 G/DL (ref 6.4–8.2)
RBC # BLD: 3.46 MIL/MCL (ref 4–5.2)
SODIUM SERPL-SCNC: 140 MMOL/L (ref 135–145)
TSH SERPL-ACNC: 0.38 MCUNITS/ML (ref 0.35–5)
WBC # BLD: 3.3 K/MCL (ref 4.2–11)

## 2021-08-23 PROCEDURE — 85025 COMPLETE CBC W/AUTO DIFF WBC: CPT | Performed by: INTERNAL MEDICINE

## 2021-08-23 PROCEDURE — 80053 COMPREHEN METABOLIC PANEL: CPT | Performed by: INTERNAL MEDICINE

## 2021-08-23 PROCEDURE — 86663 EPSTEIN-BARR ANTIBODY: CPT | Performed by: INTERNAL MEDICINE

## 2021-08-23 PROCEDURE — 82607 VITAMIN B-12: CPT | Performed by: INTERNAL MEDICINE

## 2021-08-23 PROCEDURE — 86665 EPSTEIN-BARR CAPSID VCA: CPT | Performed by: INTERNAL MEDICINE

## 2021-08-23 PROCEDURE — 84443 ASSAY THYROID STIM HORMONE: CPT | Performed by: INTERNAL MEDICINE

## 2021-08-23 PROCEDURE — 99211 OFF/OP EST MAY X REQ PHY/QHP: CPT | Performed by: INTERNAL MEDICINE

## 2021-08-23 PROCEDURE — 99214 OFFICE O/P EST MOD 30 MIN: CPT | Performed by: INTERNAL MEDICINE

## 2021-08-23 PROCEDURE — 83615 LACTATE (LD) (LDH) ENZYME: CPT | Performed by: INTERNAL MEDICINE

## 2021-08-23 PROCEDURE — 36415 COLL VENOUS BLD VENIPUNCTURE: CPT | Performed by: INTERNAL MEDICINE

## 2021-08-23 PROCEDURE — 81206 BCR/ABL1 GENE MAJOR BP: CPT | Performed by: INTERNAL MEDICINE

## 2021-08-23 ASSESSMENT — ENCOUNTER SYMPTOMS
SHORTNESS OF BREATH: 0
LIGHT-HEADEDNESS: 0
NAUSEA: 0
CONSTIPATION: 0
ABDOMINAL PAIN: 0
DIARRHEA: 0
BLOOD IN STOOL: 0
CONFUSION: 0
HEADACHES: 1
APNEA: 0
CHOKING: 0
DIAPHORESIS: 0
ABDOMINAL DISTENTION: 0
CHEST TIGHTNESS: 0
CHILLS: 1
ADENOPATHY: 0
FATIGUE: 1
FEVER: 1
WHEEZING: 0
SLEEP DISTURBANCE: 0
SPEECH DIFFICULTY: 0
UNEXPECTED WEIGHT CHANGE: 0
ACTIVITY CHANGE: 0
WEAKNESS: 1
VOMITING: 0
VOICE CHANGE: 1
APPETITE CHANGE: 0
BRUISES/BLEEDS EASILY: 0
COUGH: 0
DIZZINESS: 0
SORE THROAT: 1
TROUBLE SWALLOWING: 0
BACK PAIN: 0

## 2021-08-23 ASSESSMENT — PATIENT HEALTH QUESTIONNAIRE - PHQ9
1. LITTLE INTEREST OR PLEASURE IN DOING THINGS: NOT AT ALL
2. FEELING DOWN, DEPRESSED OR HOPELESS: NOT AT ALL
CLINICAL INTERPRETATION OF PHQ9 SCORE: NO FURTHER SCREENING NEEDED
CLINICAL INTERPRETATION OF PHQ2 SCORE: NO FURTHER SCREENING NEEDED
SUM OF ALL RESPONSES TO PHQ9 QUESTIONS 1 AND 2: 0
SUM OF ALL RESPONSES TO PHQ9 QUESTIONS 1 AND 2: 0

## 2021-08-24 LAB
EBV EA IGG SER-ACNC: <0.2 AI
EBV NA IGG SER-ACNC: >8 AI
EBV VCA IGG SER-ACNC: 7.7 AI
EBV VCA IGM SER-ACNC: 0.2 AI
FOLATE SERPL-MCNC: 14.6 NG/ML
VIT B12 SERPL-MCNC: 700 PG/ML (ref 211–911)

## 2021-08-28 LAB
BCR-ABL1, LOG REDUCTION - NUMERIC: 4
SERVICE CMNT-IMP: ABNORMAL
T(ABL1,BCR)P210 BLD/T QL: POSITIVE
T(ABL1,BCR)P210/CONTROL (IS) BLD/T: 0.01 %

## 2021-09-01 DIAGNOSIS — Z01.812 PRE-PROCEDURAL LABORATORY EXAMINATION: Primary | ICD-10-CM

## 2021-09-03 ENCOUNTER — TELEPHONE (OUTPATIENT)
Dept: PREADMISSION TESTING | Age: 41
End: 2021-09-03

## 2021-09-07 VITALS — HEIGHT: 67 IN | BODY MASS INDEX: 21.19 KG/M2 | WEIGHT: 135 LBS

## 2021-09-07 ASSESSMENT — ACTIVITIES OF DAILY LIVING (ADL)
ADL_SCORE: 12
ADL_BEFORE_ADMISSION: INDEPENDENT

## 2021-09-08 ENCOUNTER — LAB SERVICES (OUTPATIENT)
Dept: LAB | Age: 41
End: 2021-09-08

## 2021-09-08 ENCOUNTER — HOSPITAL ENCOUNTER (OUTPATIENT)
Dept: LAB | Age: 41
Discharge: HOME | End: 2021-09-08

## 2021-09-08 DIAGNOSIS — Z01.812 PRE-PROCEDURAL LABORATORY EXAMINATION: ICD-10-CM

## 2021-09-08 PROCEDURE — U0003 INFECTIOUS AGENT DETECTION BY NUCLEIC ACID (DNA OR RNA); SEVERE ACUTE RESPIRATORY SYNDROME CORONAVIRUS 2 (SARS-COV-2) (CORONAVIRUS DISEASE [COVID-19]), AMPLIFIED PROBE TECHNIQUE, MAKING USE OF HIGH THROUGHPUT TECHNOLOGIES AS DESCRIBED BY CMS-2020-01-R: HCPCS | Performed by: RADIOLOGY

## 2021-09-09 LAB
SARS-COV-2 RNA RESP QL NAA+PROBE: NOT DETECTED
SERVICE CMNT-IMP: NORMAL
SERVICE CMNT-IMP: NORMAL

## 2021-09-10 ENCOUNTER — HOSPITAL ENCOUNTER (OUTPATIENT)
Dept: INTERVENTIONAL RADIOLOGY/VASCULAR | Age: 41
Discharge: HOME | End: 2021-09-10
Attending: INTERNAL MEDICINE

## 2021-09-10 DIAGNOSIS — E04.2 NONTOXIC MULTINODULAR GOITER: ICD-10-CM

## 2021-09-10 DIAGNOSIS — R22.1 NECK MASS: ICD-10-CM

## 2021-09-10 PROCEDURE — 10002801 HB RX 250 W/O HCPCS: Performed by: RADIOLOGY

## 2021-09-10 PROCEDURE — 88173 CYTOPATH EVAL FNA REPORT: CPT | Performed by: INTERNAL MEDICINE

## 2021-09-10 PROCEDURE — 10006 FNA BX W/US GDN EA ADDL: CPT

## 2021-09-10 PROCEDURE — 10005 FNA BX W/US GDN 1ST LES: CPT

## 2021-09-10 RX ORDER — LIDOCAINE HYDROCHLORIDE 10 MG/ML
INJECTION, SOLUTION INFILTRATION; PERINEURAL PRN
Status: COMPLETED | OUTPATIENT
Start: 2021-09-10 | End: 2021-09-10

## 2021-09-10 RX ADMIN — LIDOCAINE HYDROCHLORIDE 7 ML: 10 INJECTION, SOLUTION INFILTRATION; PERINEURAL at 11:30

## 2021-09-13 LAB
ASR DISCLAIMER: NORMAL
CASE RPRT: NORMAL
CLINICAL INFO: NORMAL
PATH REPORT.FINAL DX SPEC: NORMAL
PATH REPORT.GROSS SPEC: NORMAL
PATH REPORT.MICROSCOPIC SPEC OTHER STN: NORMAL

## 2021-09-15 ENCOUNTER — APPOINTMENT (OUTPATIENT)
Dept: LAB | Age: 41
End: 2021-09-15
Attending: INTERNAL MEDICINE

## 2021-09-20 ENCOUNTER — OFFICE VISIT (OUTPATIENT)
Dept: HEMATOLOGY/ONCOLOGY | Age: 41
End: 2021-09-20
Attending: INTERNAL MEDICINE

## 2021-09-20 ENCOUNTER — HOSPITAL ENCOUNTER (OUTPATIENT)
Dept: LAB | Age: 41
Discharge: STILL ADMITTED OR ADMINISTRATIVE | End: 2021-09-20
Attending: INTERNAL MEDICINE

## 2021-09-20 VITALS
WEIGHT: 134.9 LBS | OXYGEN SATURATION: 99 % | DIASTOLIC BLOOD PRESSURE: 56 MMHG | SYSTOLIC BLOOD PRESSURE: 99 MMHG | HEART RATE: 83 BPM | BODY MASS INDEX: 21.13 KG/M2

## 2021-09-20 DIAGNOSIS — C92.11 CML IN REMISSION (CMD): ICD-10-CM

## 2021-09-20 DIAGNOSIS — C92.11 CML IN REMISSION (CMD): Primary | ICD-10-CM

## 2021-09-20 LAB
ALBUMIN SERPL-MCNC: 3.7 G/DL (ref 3.6–5.1)
ALBUMIN/GLOB SERPL: 1.2 {RATIO} (ref 1–2.4)
ALP SERPL-CCNC: 51 UNITS/L (ref 45–117)
ALT SERPL-CCNC: 29 UNITS/L
ANION GAP SERPL CALC-SCNC: 9 MMOL/L (ref 10–20)
AST SERPL-CCNC: 22 UNITS/L
BASOPHILS # BLD: 0 K/MCL (ref 0–0.3)
BASOPHILS NFR BLD: 0 %
BILIRUB SERPL-MCNC: 0.3 MG/DL (ref 0.2–1)
BUN SERPL-MCNC: 6 MG/DL (ref 6–20)
BUN/CREAT SERPL: 9 (ref 7–25)
CALCIUM SERPL-MCNC: 8.4 MG/DL (ref 8.4–10.2)
CHLORIDE SERPL-SCNC: 110 MMOL/L (ref 98–107)
CO2 SERPL-SCNC: 27 MMOL/L (ref 21–32)
CREAT SERPL-MCNC: 0.66 MG/DL (ref 0.51–0.95)
DEPRECATED RDW RBC: 44.7 FL (ref 39–50)
EOSINOPHIL # BLD: 0.1 K/MCL (ref 0–0.5)
EOSINOPHIL NFR BLD: 1 %
ERYTHROCYTE [DISTWIDTH] IN BLOOD: 12.7 % (ref 11–15)
FASTING DURATION TIME PATIENT: ABNORMAL H
GFR SERPLBLD BASED ON 1.73 SQ M-ARVRAT: >90 ML/MIN
GLOBULIN SER-MCNC: 3 G/DL (ref 2–4)
GLUCOSE SERPL-MCNC: 126 MG/DL (ref 65–99)
HCT VFR BLD CALC: 34.4 % (ref 36–46.5)
HGB BLD-MCNC: 11.2 G/DL (ref 12–15.5)
IMM GRANULOCYTES # BLD AUTO: 0 K/MCL (ref 0–0.2)
IMM GRANULOCYTES # BLD: 0 %
LYMPHOCYTES # BLD: 1 K/MCL (ref 1–4.8)
LYMPHOCYTES NFR BLD: 19 %
MCH RBC QN AUTO: 31 PG (ref 26–34)
MCHC RBC AUTO-ENTMCNC: 32.6 G/DL (ref 32–36.5)
MCV RBC AUTO: 95.3 FL (ref 78–100)
MONOCYTES # BLD: 0.4 K/MCL (ref 0.3–0.9)
MONOCYTES NFR BLD: 9 %
NEUTROPHILS # BLD: 3.5 K/MCL (ref 1.8–7.7)
NEUTROPHILS NFR BLD: 71 %
NRBC BLD MANUAL-RTO: 0 /100 WBC
PLATELET # BLD AUTO: 213 K/MCL (ref 140–450)
POTASSIUM SERPL-SCNC: 3.5 MMOL/L (ref 3.4–5.1)
PROT SERPL-MCNC: 6.7 G/DL (ref 6.4–8.2)
RBC # BLD: 3.61 MIL/MCL (ref 4–5.2)
SODIUM SERPL-SCNC: 142 MMOL/L (ref 135–145)
WBC # BLD: 5 K/MCL (ref 4.2–11)

## 2021-09-20 PROCEDURE — 85025 COMPLETE CBC W/AUTO DIFF WBC: CPT | Performed by: INTERNAL MEDICINE

## 2021-09-20 PROCEDURE — 80053 COMPREHEN METABOLIC PANEL: CPT | Performed by: INTERNAL MEDICINE

## 2021-09-20 PROCEDURE — 36415 COLL VENOUS BLD VENIPUNCTURE: CPT | Performed by: INTERNAL MEDICINE

## 2021-09-20 PROCEDURE — 99214 OFFICE O/P EST MOD 30 MIN: CPT | Performed by: INTERNAL MEDICINE

## 2021-09-20 PROCEDURE — 81206 BCR/ABL1 GENE MAJOR BP: CPT | Performed by: INTERNAL MEDICINE

## 2021-09-20 ASSESSMENT — ENCOUNTER SYMPTOMS
LIGHT-HEADEDNESS: 0
APNEA: 0
CHOKING: 0
CONSTIPATION: 0
ABDOMINAL DISTENTION: 0
TROUBLE SWALLOWING: 0
BACK PAIN: 0
FEVER: 1
SORE THROAT: 1
BRUISES/BLEEDS EASILY: 0
CHEST TIGHTNESS: 0
ADENOPATHY: 0
BLOOD IN STOOL: 0
HEADACHES: 1
CONFUSION: 0
VOICE CHANGE: 1
VOMITING: 0
DIZZINESS: 0
CHILLS: 1
APPETITE CHANGE: 0
UNEXPECTED WEIGHT CHANGE: 0
DIARRHEA: 0
WEAKNESS: 1
ACTIVITY CHANGE: 0
WHEEZING: 0
DIAPHORESIS: 0
NAUSEA: 0
SPEECH DIFFICULTY: 0
ABDOMINAL PAIN: 0
FATIGUE: 1
SHORTNESS OF BREATH: 0
COUGH: 0
SLEEP DISTURBANCE: 0

## 2021-09-20 ASSESSMENT — PATIENT HEALTH QUESTIONNAIRE - PHQ9
SUM OF ALL RESPONSES TO PHQ9 QUESTIONS 1 AND 2: 0
SUM OF ALL RESPONSES TO PHQ9 QUESTIONS 1 AND 2: 0
CLINICAL INTERPRETATION OF PHQ9 SCORE: NO FURTHER SCREENING NEEDED
CLINICAL INTERPRETATION OF PHQ2 SCORE: NO FURTHER SCREENING NEEDED
2. FEELING DOWN, DEPRESSED OR HOPELESS: NOT AT ALL
1. LITTLE INTEREST OR PLEASURE IN DOING THINGS: NOT AT ALL

## 2021-09-23 LAB
BCR-ABL1, LOG REDUCTION - NUMERIC: 4.28
SERVICE CMNT-IMP: ABNORMAL
T(ABL1,BCR)P210 BLD/T QL: POSITIVE
T(ABL1,BCR)P210/CONTROL (IS) BLD/T: 0.01 %

## 2021-10-18 ENCOUNTER — HOSPITAL ENCOUNTER (OUTPATIENT)
Dept: LAB | Age: 41
Discharge: STILL ADMITTED OR ADMINISTRATIVE | End: 2021-10-18
Attending: INTERNAL MEDICINE

## 2021-10-18 ENCOUNTER — OFFICE VISIT (OUTPATIENT)
Dept: HEMATOLOGY/ONCOLOGY | Age: 41
End: 2021-10-18
Attending: INTERNAL MEDICINE

## 2021-10-18 VITALS
RESPIRATION RATE: 16 BRPM | BODY MASS INDEX: 20.89 KG/M2 | TEMPERATURE: 98.1 F | OXYGEN SATURATION: 99 % | DIASTOLIC BLOOD PRESSURE: 69 MMHG | HEIGHT: 67 IN | HEART RATE: 72 BPM | SYSTOLIC BLOOD PRESSURE: 106 MMHG | WEIGHT: 133.1 LBS

## 2021-10-18 DIAGNOSIS — C92.11 CML IN REMISSION (CMD): Primary | ICD-10-CM

## 2021-10-18 DIAGNOSIS — C92.11 CML IN REMISSION (CMD): ICD-10-CM

## 2021-10-18 LAB
ALBUMIN SERPL-MCNC: 4.1 G/DL (ref 3.6–5.1)
ALBUMIN/GLOB SERPL: 1.5 {RATIO} (ref 1–2.4)
ALP SERPL-CCNC: 46 UNITS/L (ref 45–117)
ALT SERPL-CCNC: 27 UNITS/L
ANION GAP SERPL CALC-SCNC: 6 MMOL/L (ref 10–20)
AST SERPL-CCNC: 23 UNITS/L
BASOPHILS # BLD: 0 K/MCL (ref 0–0.3)
BASOPHILS NFR BLD: 1 %
BILIRUB SERPL-MCNC: 0.4 MG/DL (ref 0.2–1)
BUN SERPL-MCNC: 12 MG/DL (ref 6–20)
BUN/CREAT SERPL: 17 (ref 7–25)
CALCIUM SERPL-MCNC: 8.8 MG/DL (ref 8.4–10.2)
CHLORIDE SERPL-SCNC: 109 MMOL/L (ref 98–107)
CO2 SERPL-SCNC: 29 MMOL/L (ref 21–32)
CREAT SERPL-MCNC: 0.71 MG/DL (ref 0.51–0.95)
DEPRECATED RDW RBC: 43.3 FL (ref 39–50)
EOSINOPHIL # BLD: 0.1 K/MCL (ref 0–0.5)
EOSINOPHIL NFR BLD: 2 %
ERYTHROCYTE [DISTWIDTH] IN BLOOD: 12.4 % (ref 11–15)
FASTING DURATION TIME PATIENT: ABNORMAL H
GFR SERPLBLD BASED ON 1.73 SQ M-ARVRAT: >90 ML/MIN
GLOBULIN SER-MCNC: 2.8 G/DL (ref 2–4)
GLUCOSE SERPL-MCNC: 92 MG/DL (ref 70–99)
HCT VFR BLD CALC: 33.9 % (ref 36–46.5)
HGB BLD-MCNC: 11.2 G/DL (ref 12–15.5)
IMM GRANULOCYTES # BLD AUTO: 0 K/MCL (ref 0–0.2)
IMM GRANULOCYTES # BLD: 0 %
LYMPHOCYTES # BLD: 1.3 K/MCL (ref 1–4.8)
LYMPHOCYTES NFR BLD: 30 %
MCH RBC QN AUTO: 31.2 PG (ref 26–34)
MCHC RBC AUTO-ENTMCNC: 33 G/DL (ref 32–36.5)
MCV RBC AUTO: 94.4 FL (ref 78–100)
MONOCYTES # BLD: 0.3 K/MCL (ref 0.3–0.9)
MONOCYTES NFR BLD: 7 %
NEUTROPHILS # BLD: 2.7 K/MCL (ref 1.8–7.7)
NEUTROPHILS NFR BLD: 60 %
NRBC BLD MANUAL-RTO: 0 /100 WBC
PLATELET # BLD AUTO: 164 K/MCL (ref 140–450)
POTASSIUM SERPL-SCNC: 3.9 MMOL/L (ref 3.4–5.1)
PROT SERPL-MCNC: 6.9 G/DL (ref 6.4–8.2)
RAINBOW EXTRA TUBES HOLD SPECIMEN: NORMAL
RBC # BLD: 3.59 MIL/MCL (ref 4–5.2)
SARS-COV-2 S IGG SERPL QL IA: 2.27
SARS-COV-2 S RBD NAB SERPL QL SVNT: POSITIVE
SODIUM SERPL-SCNC: 140 MMOL/L (ref 135–145)
WBC # BLD: 4.5 K/MCL (ref 4.2–11)

## 2021-10-18 PROCEDURE — 85025 COMPLETE CBC W/AUTO DIFF WBC: CPT | Performed by: INTERNAL MEDICINE

## 2021-10-18 PROCEDURE — 86769 SARS-COV-2 COVID-19 ANTIBODY: CPT | Performed by: INTERNAL MEDICINE

## 2021-10-18 PROCEDURE — 99214 OFFICE O/P EST MOD 30 MIN: CPT | Performed by: INTERNAL MEDICINE

## 2021-10-18 PROCEDURE — 80053 COMPREHEN METABOLIC PANEL: CPT | Performed by: INTERNAL MEDICINE

## 2021-10-18 PROCEDURE — 99211 OFF/OP EST MAY X REQ PHY/QHP: CPT

## 2021-10-18 PROCEDURE — 36415 COLL VENOUS BLD VENIPUNCTURE: CPT | Performed by: INTERNAL MEDICINE

## 2021-10-18 PROCEDURE — 81206 BCR/ABL1 GENE MAJOR BP: CPT | Performed by: INTERNAL MEDICINE

## 2021-10-18 ASSESSMENT — ENCOUNTER SYMPTOMS
HEADACHES: 1
CHOKING: 0
SHORTNESS OF BREATH: 0
FEVER: 1
TROUBLE SWALLOWING: 0
DIZZINESS: 0
BLOOD IN STOOL: 0
DIARRHEA: 0
NAUSEA: 0
COUGH: 0
BRUISES/BLEEDS EASILY: 0
WEAKNESS: 1
UNEXPECTED WEIGHT CHANGE: 0
VOICE CHANGE: 1
BACK PAIN: 0
DIAPHORESIS: 0
APPETITE CHANGE: 0
CHEST TIGHTNESS: 0
ACTIVITY CHANGE: 0
ADENOPATHY: 0
APNEA: 0
CONSTIPATION: 0
CONFUSION: 0
ABDOMINAL PAIN: 0
WHEEZING: 0
LIGHT-HEADEDNESS: 0
SLEEP DISTURBANCE: 0
VOMITING: 0
SORE THROAT: 1
ABDOMINAL DISTENTION: 0
SPEECH DIFFICULTY: 0
FATIGUE: 1
CHILLS: 1

## 2021-10-22 LAB
BCR-ABL1, LOG REDUCTION - NUMERIC: 3.93
SERVICE CMNT-IMP: ABNORMAL
T(ABL1,BCR)P210 BLD/T QL: POSITIVE
T(ABL1,BCR)P210/CONTROL (IS) BLD/T: 0.01 %

## 2021-11-02 ENCOUNTER — TELEPHONE (OUTPATIENT)
Dept: HEMATOLOGY/ONCOLOGY | Age: 41
End: 2021-11-02

## 2021-11-02 RX ORDER — ESCITALOPRAM OXALATE 5 MG/1
5 TABLET ORAL DAILY
Qty: 30 TABLET | Refills: 0 | Status: SHIPPED | OUTPATIENT
Start: 2021-11-02

## 2021-12-13 ENCOUNTER — OFFICE VISIT (OUTPATIENT)
Dept: HEMATOLOGY/ONCOLOGY | Age: 41
End: 2021-12-13
Attending: INTERNAL MEDICINE

## 2021-12-13 ENCOUNTER — HOSPITAL ENCOUNTER (OUTPATIENT)
Dept: LAB | Age: 41
Discharge: STILL ADMITTED OR ADMINISTRATIVE | End: 2021-12-13
Attending: INTERNAL MEDICINE

## 2021-12-13 VITALS
OXYGEN SATURATION: 100 % | HEIGHT: 66 IN | WEIGHT: 133 LBS | SYSTOLIC BLOOD PRESSURE: 118 MMHG | HEART RATE: 67 BPM | BODY MASS INDEX: 21.38 KG/M2 | DIASTOLIC BLOOD PRESSURE: 62 MMHG

## 2021-12-13 DIAGNOSIS — C92.11 CML IN REMISSION (CMD): Primary | ICD-10-CM

## 2021-12-13 DIAGNOSIS — C92.11 CML IN REMISSION (CMD): ICD-10-CM

## 2021-12-13 LAB
ALBUMIN SERPL-MCNC: 4.1 G/DL (ref 3.6–5.1)
ALBUMIN/GLOB SERPL: 1.3 {RATIO} (ref 1–2.4)
ALP SERPL-CCNC: 54 UNITS/L (ref 45–117)
ALT SERPL-CCNC: 30 UNITS/L
ANION GAP SERPL CALC-SCNC: 6 MMOL/L (ref 10–20)
AST SERPL-CCNC: 26 UNITS/L
BASOPHILS # BLD: 0 K/MCL (ref 0–0.3)
BASOPHILS NFR BLD: 1 %
BILIRUB SERPL-MCNC: 0.3 MG/DL (ref 0.2–1)
BUN SERPL-MCNC: 8 MG/DL (ref 6–20)
BUN/CREAT SERPL: 13 (ref 7–25)
CALCIUM SERPL-MCNC: 9.2 MG/DL (ref 8.4–10.2)
CHLORIDE SERPL-SCNC: 109 MMOL/L (ref 98–107)
CO2 SERPL-SCNC: 28 MMOL/L (ref 21–32)
CREAT SERPL-MCNC: 0.61 MG/DL (ref 0.51–0.95)
DEPRECATED RDW RBC: 44.7 FL (ref 39–50)
EOSINOPHIL # BLD: 0.1 K/MCL (ref 0–0.5)
EOSINOPHIL NFR BLD: 2 %
ERYTHROCYTE [DISTWIDTH] IN BLOOD: 13.1 % (ref 11–15)
FASTING DURATION TIME PATIENT: ABNORMAL H
GFR SERPLBLD BASED ON 1.73 SQ M-ARVRAT: >90 ML/MIN
GLOBULIN SER-MCNC: 3.1 G/DL (ref 2–4)
GLUCOSE SERPL-MCNC: 92 MG/DL (ref 70–99)
HCT VFR BLD CALC: 35.5 % (ref 36–46.5)
HGB BLD-MCNC: 11.3 G/DL (ref 12–15.5)
IMM GRANULOCYTES # BLD AUTO: 0 K/MCL (ref 0–0.2)
IMM GRANULOCYTES # BLD: 0 %
LYMPHOCYTES # BLD: 1.2 K/MCL (ref 1–4.8)
LYMPHOCYTES NFR BLD: 28 %
MCH RBC QN AUTO: 29.8 PG (ref 26–34)
MCHC RBC AUTO-ENTMCNC: 31.8 G/DL (ref 32–36.5)
MCV RBC AUTO: 93.7 FL (ref 78–100)
MONOCYTES # BLD: 0.3 K/MCL (ref 0.3–0.9)
MONOCYTES NFR BLD: 7 %
NEUTROPHILS # BLD: 2.8 K/MCL (ref 1.8–7.7)
NEUTROPHILS NFR BLD: 62 %
NRBC BLD MANUAL-RTO: 0 /100 WBC
PLATELET # BLD AUTO: 192 K/MCL (ref 140–450)
POTASSIUM SERPL-SCNC: 3.7 MMOL/L (ref 3.4–5.1)
PROT SERPL-MCNC: 7.2 G/DL (ref 6.4–8.2)
RBC # BLD: 3.79 MIL/MCL (ref 4–5.2)
SODIUM SERPL-SCNC: 139 MMOL/L (ref 135–145)
WBC # BLD: 4.5 K/MCL (ref 4.2–11)

## 2021-12-13 PROCEDURE — 81206 BCR/ABL1 GENE MAJOR BP: CPT | Performed by: INTERNAL MEDICINE

## 2021-12-13 PROCEDURE — 99214 OFFICE O/P EST MOD 30 MIN: CPT | Performed by: INTERNAL MEDICINE

## 2021-12-13 PROCEDURE — 36415 COLL VENOUS BLD VENIPUNCTURE: CPT | Performed by: INTERNAL MEDICINE

## 2021-12-13 PROCEDURE — 85025 COMPLETE CBC W/AUTO DIFF WBC: CPT | Performed by: INTERNAL MEDICINE

## 2021-12-13 PROCEDURE — 99211 OFF/OP EST MAY X REQ PHY/QHP: CPT

## 2021-12-13 PROCEDURE — 80053 COMPREHEN METABOLIC PANEL: CPT | Performed by: INTERNAL MEDICINE

## 2021-12-13 ASSESSMENT — ENCOUNTER SYMPTOMS
CONFUSION: 0
DIARRHEA: 0
APNEA: 0
TROUBLE SWALLOWING: 0
HEADACHES: 1
ABDOMINAL DISTENTION: 0
BACK PAIN: 0
SORE THROAT: 1
SHORTNESS OF BREATH: 0
ACTIVITY CHANGE: 0
NAUSEA: 0
FEVER: 1
UNEXPECTED WEIGHT CHANGE: 0
ABDOMINAL PAIN: 0
VOMITING: 0
LIGHT-HEADEDNESS: 0
SLEEP DISTURBANCE: 0
FATIGUE: 1
WEAKNESS: 1
BRUISES/BLEEDS EASILY: 0
CONSTIPATION: 0
COUGH: 0
DIZZINESS: 0
WHEEZING: 0
CHEST TIGHTNESS: 0
CHOKING: 0
SPEECH DIFFICULTY: 0
BLOOD IN STOOL: 0
APPETITE CHANGE: 0
VOICE CHANGE: 1
CHILLS: 1
ADENOPATHY: 0
DIAPHORESIS: 0

## 2021-12-13 ASSESSMENT — PATIENT HEALTH QUESTIONNAIRE - PHQ9
SUM OF ALL RESPONSES TO PHQ9 QUESTIONS 1 AND 2: 2
1. LITTLE INTEREST OR PLEASURE IN DOING THINGS: SEVERAL DAYS
CLINICAL INTERPRETATION OF PHQ2 SCORE: NO FURTHER SCREENING NEEDED
SUM OF ALL RESPONSES TO PHQ9 QUESTIONS 1 AND 2: 2
2. FEELING DOWN, DEPRESSED OR HOPELESS: SEVERAL DAYS

## 2021-12-15 ENCOUNTER — APPOINTMENT (OUTPATIENT)
Dept: LAB | Age: 41
End: 2021-12-15
Attending: INTERNAL MEDICINE

## 2021-12-15 ENCOUNTER — APPOINTMENT (OUTPATIENT)
Dept: HEMATOLOGY/ONCOLOGY | Age: 41
End: 2021-12-15
Attending: INTERNAL MEDICINE

## 2021-12-16 LAB
BCR-ABL1, LOG REDUCTION - NUMERIC: 4.14
SERVICE CMNT-IMP: ABNORMAL
T(ABL1,BCR)P210 BLD/T QL: POSITIVE
T(ABL1,BCR)P210/CONTROL (IS) BLD/T: 0.01 %

## 2022-01-28 DIAGNOSIS — C92.11 CML IN REMISSION (CMD): ICD-10-CM

## 2022-01-28 RX ORDER — IMATINIB MESYLATE 400 MG/1
400 TABLET, FILM COATED ORAL DAILY
Qty: 30 TABLET | Refills: 0 | Status: SHIPPED | OUTPATIENT
Start: 2022-01-28 | End: 2022-03-07

## 2022-02-16 ENCOUNTER — OFFICE VISIT (OUTPATIENT)
Dept: HEMATOLOGY/ONCOLOGY | Age: 42
End: 2022-02-16
Attending: INTERNAL MEDICINE

## 2022-02-16 ENCOUNTER — HOSPITAL ENCOUNTER (OUTPATIENT)
Dept: LAB | Age: 42
Discharge: STILL ADMITTED OR ADMINISTRATIVE | End: 2022-02-16
Attending: INTERNAL MEDICINE

## 2022-02-16 VITALS
TEMPERATURE: 97.1 F | DIASTOLIC BLOOD PRESSURE: 60 MMHG | OXYGEN SATURATION: 100 % | HEART RATE: 67 BPM | HEIGHT: 66 IN | RESPIRATION RATE: 18 BRPM | SYSTOLIC BLOOD PRESSURE: 120 MMHG | BODY MASS INDEX: 21.53 KG/M2 | WEIGHT: 134 LBS

## 2022-02-16 DIAGNOSIS — Z80.9 FAMILY HISTORY OF CANCER: Primary | ICD-10-CM

## 2022-02-16 DIAGNOSIS — M04.1 FAMILIAL MEDITERRANEAN FEVER (CMD): ICD-10-CM

## 2022-02-16 DIAGNOSIS — C92.11 CML IN REMISSION (CMD): ICD-10-CM

## 2022-02-16 LAB
ALBUMIN SERPL-MCNC: 4 G/DL (ref 3.6–5.1)
ALBUMIN/GLOB SERPL: 1.3 {RATIO} (ref 1–2.4)
ALP SERPL-CCNC: 47 UNITS/L (ref 45–117)
ALT SERPL-CCNC: 23 UNITS/L
ANION GAP SERPL CALC-SCNC: 8 MMOL/L (ref 10–20)
AST SERPL-CCNC: 27 UNITS/L
BASOPHILS # BLD: 0 K/MCL (ref 0–0.3)
BASOPHILS NFR BLD: 1 %
BILIRUB SERPL-MCNC: 0.2 MG/DL (ref 0.2–1)
BUN SERPL-MCNC: 10 MG/DL (ref 6–20)
BUN/CREAT SERPL: 17 (ref 7–25)
CALCIUM SERPL-MCNC: 9.2 MG/DL (ref 8.4–10.2)
CHLORIDE SERPL-SCNC: 108 MMOL/L (ref 98–107)
CO2 SERPL-SCNC: 25 MMOL/L (ref 21–32)
CREAT SERPL-MCNC: 0.6 MG/DL (ref 0.51–0.95)
DEPRECATED RDW RBC: 45.2 FL (ref 39–50)
EOSINOPHIL # BLD: 0.1 K/MCL (ref 0–0.5)
EOSINOPHIL NFR BLD: 1 %
ERYTHROCYTE [DISTWIDTH] IN BLOOD: 13.2 % (ref 11–15)
FASTING DURATION TIME PATIENT: ABNORMAL H
GFR SERPLBLD BASED ON 1.73 SQ M-ARVRAT: >90 ML/MIN
GLOBULIN SER-MCNC: 3 G/DL (ref 2–4)
GLUCOSE SERPL-MCNC: 86 MG/DL (ref 70–99)
HCT VFR BLD CALC: 33.9 % (ref 36–46.5)
HGB BLD-MCNC: 11.3 G/DL (ref 12–15.5)
IMM GRANULOCYTES # BLD AUTO: 0 K/MCL (ref 0–0.2)
IMM GRANULOCYTES # BLD: 0 %
LYMPHOCYTES # BLD: 1.3 K/MCL (ref 1–4.8)
LYMPHOCYTES NFR BLD: 30 %
MCH RBC QN AUTO: 31.2 PG (ref 26–34)
MCHC RBC AUTO-ENTMCNC: 33.3 G/DL (ref 32–36.5)
MCV RBC AUTO: 93.6 FL (ref 78–100)
MONOCYTES # BLD: 0.4 K/MCL (ref 0.3–0.9)
MONOCYTES NFR BLD: 8 %
NEUTROPHILS # BLD: 2.6 K/MCL (ref 1.8–7.7)
NEUTROPHILS NFR BLD: 60 %
NRBC BLD MANUAL-RTO: 0 /100 WBC
PLATELET # BLD AUTO: 170 K/MCL (ref 140–450)
POTASSIUM SERPL-SCNC: 4.3 MMOL/L (ref 3.4–5.1)
PROT SERPL-MCNC: 7 G/DL (ref 6.4–8.2)
RBC # BLD: 3.62 MIL/MCL (ref 4–5.2)
SODIUM SERPL-SCNC: 137 MMOL/L (ref 135–145)
WBC # BLD: 4.3 K/MCL (ref 4.2–11)

## 2022-02-16 PROCEDURE — 81206 BCR/ABL1 GENE MAJOR BP: CPT | Performed by: INTERNAL MEDICINE

## 2022-02-16 PROCEDURE — 99211 OFF/OP EST MAY X REQ PHY/QHP: CPT

## 2022-02-16 PROCEDURE — 85025 COMPLETE CBC W/AUTO DIFF WBC: CPT | Performed by: INTERNAL MEDICINE

## 2022-02-16 PROCEDURE — 36415 COLL VENOUS BLD VENIPUNCTURE: CPT | Performed by: INTERNAL MEDICINE

## 2022-02-16 PROCEDURE — 80053 COMPREHEN METABOLIC PANEL: CPT | Performed by: INTERNAL MEDICINE

## 2022-02-16 PROCEDURE — 99214 OFFICE O/P EST MOD 30 MIN: CPT | Performed by: INTERNAL MEDICINE

## 2022-02-16 ASSESSMENT — ENCOUNTER SYMPTOMS
CHOKING: 0
FATIGUE: 1
NAUSEA: 0
ADENOPATHY: 0
DIZZINESS: 0
CONFUSION: 0
DIARRHEA: 0
BACK PAIN: 0
HEADACHES: 1
COUGH: 0
UNEXPECTED WEIGHT CHANGE: 0
SLEEP DISTURBANCE: 0
SORE THROAT: 1
FEVER: 1
VOICE CHANGE: 1
LIGHT-HEADEDNESS: 0
DIAPHORESIS: 0
CONSTIPATION: 0
ACTIVITY CHANGE: 0
SHORTNESS OF BREATH: 0
ABDOMINAL PAIN: 0
VOMITING: 0
TROUBLE SWALLOWING: 0
SPEECH DIFFICULTY: 0
APPETITE CHANGE: 0
BLOOD IN STOOL: 0
APNEA: 0
ABDOMINAL DISTENTION: 0
WEAKNESS: 1
CHILLS: 1
CHEST TIGHTNESS: 0
BRUISES/BLEEDS EASILY: 0
WHEEZING: 0

## 2022-02-16 ASSESSMENT — PATIENT HEALTH QUESTIONNAIRE - PHQ9
2. FEELING DOWN, DEPRESSED OR HOPELESS: NOT AT ALL
1. LITTLE INTEREST OR PLEASURE IN DOING THINGS: NOT AT ALL
SUM OF ALL RESPONSES TO PHQ9 QUESTIONS 1 AND 2: 0
CLINICAL INTERPRETATION OF PHQ2 SCORE: NO FURTHER SCREENING NEEDED
SUM OF ALL RESPONSES TO PHQ9 QUESTIONS 1 AND 2: 0

## 2022-02-21 LAB
BCR-ABL1, LOG REDUCTION - NUMERIC: 3.82
SERVICE CMNT-IMP: ABNORMAL
T(ABL1,BCR)P210 BLD/T QL: POSITIVE
T(ABL1,BCR)P210/CONTROL (IS) BLD/T: 0.01 %

## 2022-03-06 DIAGNOSIS — C92.11 CML IN REMISSION (CMD): ICD-10-CM

## 2022-03-07 RX ORDER — IMATINIB MESYLATE 400 MG/1
400 TABLET, FILM COATED ORAL DAILY
Qty: 30 TABLET | Refills: 1 | Status: SHIPPED | OUTPATIENT
Start: 2022-03-07 | End: 2022-05-02

## 2022-03-12 ENCOUNTER — HOSPITAL ENCOUNTER (OUTPATIENT)
Dept: LAB | Age: 42
Discharge: HOME | End: 2022-03-12
Attending: INTERNAL MEDICINE

## 2022-03-12 DIAGNOSIS — D50.8 IRON DEFICIENCY ANEMIA SECONDARY TO INADEQUATE DIETARY IRON INTAKE: ICD-10-CM

## 2022-03-12 DIAGNOSIS — R30.0 DYSURIA: ICD-10-CM

## 2022-03-12 DIAGNOSIS — E78.81 LIPOID DERMATOARTHRITIS: ICD-10-CM

## 2022-03-12 DIAGNOSIS — D64.9 ANEMIA, UNSPECIFIED TYPE: ICD-10-CM

## 2022-03-12 DIAGNOSIS — E83.40 DISORDER OF MAGNESIUM METABOLISM: ICD-10-CM

## 2022-03-12 DIAGNOSIS — E78.5 HYPERLIPIDEMIA, UNSPECIFIED HYPERLIPIDEMIA TYPE: ICD-10-CM

## 2022-03-12 DIAGNOSIS — R53.81 PHYSICAL DEBILITY: ICD-10-CM

## 2022-03-12 DIAGNOSIS — R10.13 ABDOMINAL PAIN, EPIGASTRIC: ICD-10-CM

## 2022-03-12 DIAGNOSIS — E61.1 IRON DEFICIENCY: ICD-10-CM

## 2022-03-12 DIAGNOSIS — I25.10 CVD (CARDIOVASCULAR DISEASE): ICD-10-CM

## 2022-03-12 DIAGNOSIS — E53.8 VITAMIN B12 DEFICIENCY (NON ANEMIC): ICD-10-CM

## 2022-03-12 DIAGNOSIS — Z12.11 SCREEN FOR COLON CANCER: ICD-10-CM

## 2022-03-12 DIAGNOSIS — R19.09 INGUINAL SWELLING: ICD-10-CM

## 2022-03-12 DIAGNOSIS — R53.82 CHRONIC FATIGUE: ICD-10-CM

## 2022-03-12 DIAGNOSIS — M25.461 SWELLING OF RIGHT KNEE JOINT: ICD-10-CM

## 2022-03-12 DIAGNOSIS — U09.9 POST-COVID-19 SYNDROME: Primary | ICD-10-CM

## 2022-03-12 DIAGNOSIS — E03.9 ACQUIRED HYPOTHYROIDISM: ICD-10-CM

## 2022-03-12 DIAGNOSIS — M79.10 MUSCLE PAIN: ICD-10-CM

## 2022-03-12 DIAGNOSIS — Z00.01 ABNORMAL PHYSICAL EVALUATION: ICD-10-CM

## 2022-03-12 DIAGNOSIS — Z12.11 SCREENING FOR COLON CANCER: ICD-10-CM

## 2022-03-12 DIAGNOSIS — E55.9 VITAMIN D DEFICIENCY: ICD-10-CM

## 2022-03-12 LAB
25(OH)D3+25(OH)D2 SERPL-MCNC: 28.4 NG/ML (ref 30–100)
ALBUMIN SERPL-MCNC: 4.1 G/DL (ref 3.6–5.1)
ALBUMIN/GLOB SERPL: 1.5 {RATIO} (ref 1–2.4)
ALP SERPL-CCNC: 52 UNITS/L (ref 45–117)
ALT SERPL-CCNC: 32 UNITS/L
ANION GAP SERPL CALC-SCNC: 8 MMOL/L (ref 10–20)
APPEARANCE UR: CLEAR
AST SERPL-CCNC: 30 UNITS/L
BACTERIA #/AREA URNS HPF: ABNORMAL /HPF
BASOPHILS # BLD: 0 K/MCL (ref 0–0.3)
BASOPHILS NFR BLD: 1 %
BILIRUB SERPL-MCNC: 0.5 MG/DL (ref 0.2–1)
BILIRUB UR QL STRIP: NEGATIVE
BUN SERPL-MCNC: 6 MG/DL (ref 6–20)
BUN/CREAT SERPL: 11 (ref 7–25)
CALCIUM SERPL-MCNC: 8.7 MG/DL (ref 8.4–10.2)
CANCER AG125 SERPL-ACNC: 9 UNITS/ML (ref 0–35)
CEA SERPL-MCNC: <0.5 NG/ML (ref 0–5)
CHLORIDE SERPL-SCNC: 109 MMOL/L (ref 98–107)
CHOLEST SERPL-MCNC: 153 MG/DL
CHOLEST/HDLC SERPL: 2.4 {RATIO}
CK SERPL-CCNC: 256 UNITS/L (ref 26–192)
CO2 SERPL-SCNC: 25 MMOL/L (ref 21–32)
COLOR UR: YELLOW
CREAT SERPL-MCNC: 0.57 MG/DL (ref 0.51–0.95)
CRP SERPL-MCNC: <0.3 MG/DL
D DIMER PPP FEU-MCNC: 0.38 MG/L (FEU)
DEPRECATED RDW RBC: 44.2 FL (ref 39–50)
EOSINOPHIL # BLD: 0.1 K/MCL (ref 0–0.5)
EOSINOPHIL NFR BLD: 2 %
ERYTHROCYTE [DISTWIDTH] IN BLOOD: 12.9 % (ref 11–15)
ERYTHROCYTE [SEDIMENTATION RATE] IN BLOOD BY WESTERGREN METHOD: 2 MM/HR (ref 0–20)
FASTING DURATION TIME PATIENT: ABNORMAL H
FASTING DURATION TIME PATIENT: NORMAL H
FERRITIN SERPL-MCNC: 24 NG/ML (ref 8–252)
GFR SERPLBLD BASED ON 1.73 SQ M-ARVRAT: >90 ML/MIN
GLOBULIN SER-MCNC: 2.7 G/DL (ref 2–4)
GLUCOSE SERPL-MCNC: 83 MG/DL (ref 70–99)
GLUCOSE UR STRIP-MCNC: NEGATIVE MG/DL
HCT VFR BLD CALC: 34.4 % (ref 36–46.5)
HDLC SERPL-MCNC: 64 MG/DL
HGB BLD-MCNC: 11.4 G/DL (ref 12–15.5)
HGB UR QL STRIP: NEGATIVE
HYALINE CASTS #/AREA URNS LPF: ABNORMAL /LPF
IMM GRANULOCYTES # BLD AUTO: 0 K/MCL (ref 0–0.2)
IMM GRANULOCYTES # BLD: 0 %
IRON SATN MFR SERPL: 34 % (ref 15–45)
IRON SERPL-MCNC: 105 MCG/DL (ref 50–170)
KETONES UR STRIP-MCNC: ABNORMAL MG/DL
LDH SERPL L TO P-CCNC: 199 UNITS/L (ref 82–240)
LDLC SERPL CALC-MCNC: 81 MG/DL
LEUKOCYTE ESTERASE UR QL STRIP: NEGATIVE
LIPASE SERPL-CCNC: 120 UNITS/L (ref 73–393)
LYMPHOCYTES # BLD: 0.9 K/MCL (ref 1–4.8)
LYMPHOCYTES NFR BLD: 32 %
MAGNESIUM SERPL-MCNC: 2.4 MG/DL (ref 1.7–2.4)
MCH RBC QN AUTO: 30.8 PG (ref 26–34)
MCHC RBC AUTO-ENTMCNC: 33.1 G/DL (ref 32–36.5)
MCV RBC AUTO: 93 FL (ref 78–100)
MONOCYTES # BLD: 0.2 K/MCL (ref 0.3–0.9)
MONOCYTES NFR BLD: 8 %
MUCOUS THREADS URNS QL MICRO: PRESENT
NEUTROPHILS # BLD: 1.5 K/MCL (ref 1.8–7.7)
NEUTROPHILS NFR BLD: 57 %
NITRITE UR QL STRIP: NEGATIVE
NONHDLC SERPL-MCNC: 89 MG/DL
NRBC BLD MANUAL-RTO: 0 /100 WBC
PH UR STRIP: 7 [PH] (ref 5–7)
PLATELET # BLD AUTO: 184 K/MCL (ref 140–450)
POTASSIUM SERPL-SCNC: 3.7 MMOL/L (ref 3.4–5.1)
PROT SERPL-MCNC: 6.8 G/DL (ref 6.4–8.2)
PROT UR STRIP-MCNC: NEGATIVE MG/DL
RAINBOW EXTRA TUBES HOLD SPECIMEN: NORMAL
RAINBOW EXTRA TUBES HOLD SPECIMEN: NORMAL
RBC # BLD: 3.7 MIL/MCL (ref 4–5.2)
RBC #/AREA URNS HPF: ABNORMAL /HPF
RHEUMATOID FACT SER NEPH-ACNC: <10 UNITS/ML
SODIUM SERPL-SCNC: 138 MMOL/L (ref 135–145)
SP GR UR STRIP: 1.02 (ref 1–1.03)
SQUAMOUS #/AREA URNS HPF: ABNORMAL /HPF
T3FREE SERPL-MCNC: 2.9 PG/ML (ref 2.2–4)
T4 FREE SERPL-MCNC: 1 NG/DL (ref 0.8–1.5)
TIBC SERPL-MCNC: 305 MCG/DL (ref 250–450)
TRIGL SERPL-MCNC: 40 MG/DL
TSH SERPL-ACNC: 0.46 MCUNITS/ML (ref 0.35–5)
URATE SERPL-MCNC: 2.6 MG/DL (ref 2.6–5.9)
UROBILINOGEN UR STRIP-MCNC: 0.2 MG/DL
VIT B12 SERPL-MCNC: 720 PG/ML (ref 211–911)
WBC # BLD: 2.7 K/MCL (ref 4.2–11)
WBC #/AREA URNS HPF: ABNORMAL /HPF

## 2022-03-12 PROCEDURE — 82607 VITAMIN B-12: CPT

## 2022-03-12 PROCEDURE — 86431 RHEUMATOID FACTOR QUANT: CPT

## 2022-03-12 PROCEDURE — 82378 CARCINOEMBRYONIC ANTIGEN: CPT

## 2022-03-12 PROCEDURE — 85652 RBC SED RATE AUTOMATED: CPT

## 2022-03-12 PROCEDURE — 36415 COLL VENOUS BLD VENIPUNCTURE: CPT

## 2022-03-12 PROCEDURE — 84443 ASSAY THYROID STIM HORMONE: CPT

## 2022-03-12 PROCEDURE — 86038 ANTINUCLEAR ANTIBODIES: CPT

## 2022-03-12 PROCEDURE — 83540 ASSAY OF IRON: CPT

## 2022-03-12 PROCEDURE — 80053 COMPREHEN METABOLIC PANEL: CPT

## 2022-03-12 PROCEDURE — 83735 ASSAY OF MAGNESIUM: CPT

## 2022-03-12 PROCEDURE — 86140 C-REACTIVE PROTEIN: CPT

## 2022-03-12 PROCEDURE — 87086 URINE CULTURE/COLONY COUNT: CPT

## 2022-03-12 PROCEDURE — 84550 ASSAY OF BLOOD/URIC ACID: CPT

## 2022-03-12 PROCEDURE — 83615 LACTATE (LD) (LDH) ENZYME: CPT

## 2022-03-12 PROCEDURE — 82306 VITAMIN D 25 HYDROXY: CPT

## 2022-03-12 PROCEDURE — 82728 ASSAY OF FERRITIN: CPT

## 2022-03-12 PROCEDURE — 84439 ASSAY OF FREE THYROXINE: CPT

## 2022-03-12 PROCEDURE — 85025 COMPLETE CBC W/AUTO DIFF WBC: CPT

## 2022-03-12 PROCEDURE — 86304 IMMUNOASSAY TUMOR CA 125: CPT

## 2022-03-12 PROCEDURE — 82550 ASSAY OF CK (CPK): CPT

## 2022-03-12 PROCEDURE — 81001 URINALYSIS AUTO W/SCOPE: CPT

## 2022-03-12 PROCEDURE — 85379 FIBRIN DEGRADATION QUANT: CPT

## 2022-03-12 PROCEDURE — 80061 LIPID PANEL: CPT

## 2022-03-12 PROCEDURE — 86060 ANTISTREPTOLYSIN O TITER: CPT

## 2022-03-12 PROCEDURE — 83690 ASSAY OF LIPASE: CPT

## 2022-03-12 PROCEDURE — 84481 FREE ASSAY (FT-3): CPT

## 2022-03-13 LAB — BACTERIA UR CULT: NORMAL

## 2022-03-14 LAB
ANA SER QL IA: NEGATIVE
ASO AB TITR SER LA: 100 IUNITS/ML

## 2022-03-21 ENCOUNTER — APPOINTMENT (OUTPATIENT)
Dept: HEMATOLOGY/ONCOLOGY | Age: 42
End: 2022-03-21
Attending: INTERNAL MEDICINE

## 2022-03-21 ENCOUNTER — APPOINTMENT (OUTPATIENT)
Dept: LAB | Age: 42
End: 2022-03-21
Attending: INTERNAL MEDICINE

## 2022-03-24 ENCOUNTER — OFFICE VISIT (OUTPATIENT)
Dept: HEMATOLOGY/ONCOLOGY | Age: 42
End: 2022-03-24
Attending: INTERNAL MEDICINE

## 2022-03-24 ENCOUNTER — HOSPITAL ENCOUNTER (OUTPATIENT)
Dept: LAB | Age: 42
Discharge: STILL ADMITTED OR ADMINISTRATIVE | End: 2022-03-24
Attending: INTERNAL MEDICINE

## 2022-03-24 VITALS
HEIGHT: 66 IN | SYSTOLIC BLOOD PRESSURE: 112 MMHG | WEIGHT: 131 LBS | OXYGEN SATURATION: 97 % | HEART RATE: 74 BPM | DIASTOLIC BLOOD PRESSURE: 66 MMHG | BODY MASS INDEX: 21.05 KG/M2 | TEMPERATURE: 98.7 F

## 2022-03-24 DIAGNOSIS — C92.11 CML IN REMISSION (CMD): Primary | ICD-10-CM

## 2022-03-24 DIAGNOSIS — M04.1 FAMILIAL MEDITERRANEAN FEVER (CMD): ICD-10-CM

## 2022-03-24 DIAGNOSIS — C92.11 CML IN REMISSION (CMD): ICD-10-CM

## 2022-03-24 LAB
ALBUMIN SERPL-MCNC: 4.2 G/DL (ref 3.6–5.1)
ALBUMIN/GLOB SERPL: 1.3 {RATIO} (ref 1–2.4)
ALP SERPL-CCNC: 52 UNITS/L (ref 45–117)
ALT SERPL-CCNC: 25 UNITS/L
ANION GAP SERPL CALC-SCNC: 7 MMOL/L (ref 10–20)
AST SERPL-CCNC: 21 UNITS/L
BASOPHILS # BLD: 0 K/MCL (ref 0–0.3)
BASOPHILS NFR BLD: 0 %
BILIRUB SERPL-MCNC: 0.2 MG/DL (ref 0.2–1)
BUN SERPL-MCNC: 7 MG/DL (ref 6–20)
BUN/CREAT SERPL: 13 (ref 7–25)
CALCIUM SERPL-MCNC: 9.1 MG/DL (ref 8.4–10.2)
CHLORIDE SERPL-SCNC: 110 MMOL/L (ref 98–107)
CO2 SERPL-SCNC: 26 MMOL/L (ref 21–32)
CREAT SERPL-MCNC: 0.55 MG/DL (ref 0.51–0.95)
DEPRECATED RDW RBC: 43.8 FL (ref 39–50)
EOSINOPHIL # BLD: 0 K/MCL (ref 0–0.5)
EOSINOPHIL NFR BLD: 1 %
ERYTHROCYTE [DISTWIDTH] IN BLOOD: 12.7 % (ref 11–15)
FASTING DURATION TIME PATIENT: ABNORMAL H
GFR SERPLBLD BASED ON 1.73 SQ M-ARVRAT: >90 ML/MIN
GLOBULIN SER-MCNC: 3.3 G/DL (ref 2–4)
GLUCOSE SERPL-MCNC: 98 MG/DL (ref 70–99)
HCT VFR BLD CALC: 37.2 % (ref 36–46.5)
HGB BLD-MCNC: 12.2 G/DL (ref 12–15.5)
IMM GRANULOCYTES # BLD AUTO: 0 K/MCL (ref 0–0.2)
IMM GRANULOCYTES # BLD: 0 %
LYMPHOCYTES # BLD: 1.3 K/MCL (ref 1–4.8)
LYMPHOCYTES NFR BLD: 37 %
MCH RBC QN AUTO: 30.7 PG (ref 26–34)
MCHC RBC AUTO-ENTMCNC: 32.8 G/DL (ref 32–36.5)
MCV RBC AUTO: 93.5 FL (ref 78–100)
MONOCYTES # BLD: 0.3 K/MCL (ref 0.3–0.9)
MONOCYTES NFR BLD: 8 %
NEUTROPHILS # BLD: 1.9 K/MCL (ref 1.8–7.7)
NEUTROPHILS NFR BLD: 54 %
NRBC BLD MANUAL-RTO: 0 /100 WBC
PLATELET # BLD AUTO: 218 K/MCL (ref 140–450)
POTASSIUM SERPL-SCNC: 4.3 MMOL/L (ref 3.4–5.1)
PROT SERPL-MCNC: 7.5 G/DL (ref 6.4–8.2)
RBC # BLD: 3.98 MIL/MCL (ref 4–5.2)
SODIUM SERPL-SCNC: 139 MMOL/L (ref 135–145)
WBC # BLD: 3.6 K/MCL (ref 4.2–11)

## 2022-03-24 PROCEDURE — 81206 BCR/ABL1 GENE MAJOR BP: CPT | Performed by: INTERNAL MEDICINE

## 2022-03-24 PROCEDURE — 86200 CCP ANTIBODY: CPT | Performed by: INTERNAL MEDICINE

## 2022-03-24 PROCEDURE — 85025 COMPLETE CBC W/AUTO DIFF WBC: CPT | Performed by: INTERNAL MEDICINE

## 2022-03-24 PROCEDURE — 99211 OFF/OP EST MAY X REQ PHY/QHP: CPT

## 2022-03-24 PROCEDURE — 36415 COLL VENOUS BLD VENIPUNCTURE: CPT | Performed by: INTERNAL MEDICINE

## 2022-03-24 PROCEDURE — 81374 HLA I TYPING 1 ANTIGEN LR: CPT | Performed by: INTERNAL MEDICINE

## 2022-03-24 PROCEDURE — 99214 OFFICE O/P EST MOD 30 MIN: CPT | Performed by: INTERNAL MEDICINE

## 2022-03-24 PROCEDURE — 80053 COMPREHEN METABOLIC PANEL: CPT | Performed by: INTERNAL MEDICINE

## 2022-03-24 ASSESSMENT — PATIENT HEALTH QUESTIONNAIRE - PHQ9
SUM OF ALL RESPONSES TO PHQ9 QUESTIONS 1 AND 2: 0
2. FEELING DOWN, DEPRESSED OR HOPELESS: NOT AT ALL
SUM OF ALL RESPONSES TO PHQ9 QUESTIONS 1 AND 2: 0
CLINICAL INTERPRETATION OF PHQ2 SCORE: NO FURTHER SCREENING NEEDED
1. LITTLE INTEREST OR PLEASURE IN DOING THINGS: NOT AT ALL

## 2022-03-24 ASSESSMENT — ENCOUNTER SYMPTOMS
ADENOPATHY: 0
FEVER: 1
SORE THROAT: 1
VOMITING: 0
COUGH: 0
BLOOD IN STOOL: 0
ABDOMINAL PAIN: 0
WEAKNESS: 1
APNEA: 0
WHEEZING: 0
SHORTNESS OF BREATH: 0
HEADACHES: 1
DIAPHORESIS: 0
DIARRHEA: 0
ABDOMINAL DISTENTION: 0
TROUBLE SWALLOWING: 0
BACK PAIN: 0
UNEXPECTED WEIGHT CHANGE: 0
SPEECH DIFFICULTY: 0
APPETITE CHANGE: 0
CHOKING: 0
CONSTIPATION: 0
SLEEP DISTURBANCE: 0
CHEST TIGHTNESS: 0
FATIGUE: 1
CONFUSION: 0
BRUISES/BLEEDS EASILY: 0
VOICE CHANGE: 1
ACTIVITY CHANGE: 0
NAUSEA: 0
LIGHT-HEADEDNESS: 0
CHILLS: 1
DIZZINESS: 0

## 2022-03-25 LAB — CCP AB SER IA-ACNC: 3 UNITS

## 2022-03-28 LAB
DATE OF ANALYSIS SPEC: NORMAL
HLA-B27 RELATED AG QL: NEGATIVE
SERVICE CMNT-IMP: NORMAL

## 2022-03-29 LAB
BCR-ABL1, LOG REDUCTION - NUMERIC: 3.76
SERVICE CMNT-IMP: ABNORMAL
T(ABL1,BCR)P210 BLD/T QL: POSITIVE
T(ABL1,BCR)P210/CONTROL (IS) BLD/T: 0.02 %

## 2022-04-18 ENCOUNTER — HOSPITAL ENCOUNTER (OUTPATIENT)
Dept: LAB | Age: 42
Discharge: HOME | End: 2022-04-18
Attending: INTERNAL MEDICINE

## 2022-04-18 DIAGNOSIS — D64.9 ANEMIA, UNSPECIFIED: Primary | ICD-10-CM

## 2022-04-18 LAB
BASOPHILS # BLD: 0 K/MCL (ref 0–0.3)
BASOPHILS NFR BLD: 1 %
DEPRECATED RDW RBC: 45.2 FL (ref 39–50)
EOSINOPHIL # BLD: 0.1 K/MCL (ref 0–0.5)
EOSINOPHIL NFR BLD: 1 %
ERYTHROCYTE [DISTWIDTH] IN BLOOD: 13.1 % (ref 11–15)
HCT VFR BLD CALC: 36.8 % (ref 36–46.5)
HGB BLD-MCNC: 11.9 G/DL (ref 12–15.5)
IMM GRANULOCYTES # BLD AUTO: 0 K/MCL (ref 0–0.2)
IMM GRANULOCYTES # BLD: 0 %
LYMPHOCYTES # BLD: 1.5 K/MCL (ref 1–4.8)
LYMPHOCYTES NFR BLD: 31 %
MCH RBC QN AUTO: 30.4 PG (ref 26–34)
MCHC RBC AUTO-ENTMCNC: 32.3 G/DL (ref 32–36.5)
MCV RBC AUTO: 94.1 FL (ref 78–100)
MONOCYTES # BLD: 0.3 K/MCL (ref 0.3–0.9)
MONOCYTES NFR BLD: 6 %
NEUTROPHILS # BLD: 3.1 K/MCL (ref 1.8–7.7)
NEUTROPHILS NFR BLD: 61 %
NRBC BLD MANUAL-RTO: 0 /100 WBC
PLATELET # BLD AUTO: 224 K/MCL (ref 140–450)
RBC # BLD: 3.91 MIL/MCL (ref 4–5.2)
WBC # BLD: 5 K/MCL (ref 4.2–11)

## 2022-04-18 PROCEDURE — 85025 COMPLETE CBC W/AUTO DIFF WBC: CPT

## 2022-04-18 PROCEDURE — 36415 COLL VENOUS BLD VENIPUNCTURE: CPT

## 2022-05-02 DIAGNOSIS — C92.11 CML IN REMISSION (CMD): ICD-10-CM

## 2022-05-02 RX ORDER — IMATINIB MESYLATE 400 MG/1
TABLET, FILM COATED ORAL
Qty: 30 TABLET | Refills: 3 | Status: SHIPPED | OUTPATIENT
Start: 2022-05-02 | End: 2022-09-06

## 2022-05-31 ENCOUNTER — TELEPHONE (OUTPATIENT)
Dept: HEMATOLOGY/ONCOLOGY | Age: 42
End: 2022-05-31

## 2022-06-06 ENCOUNTER — OFFICE VISIT (OUTPATIENT)
Dept: HEMATOLOGY/ONCOLOGY | Age: 42
End: 2022-06-06
Attending: INTERNAL MEDICINE

## 2022-06-06 ENCOUNTER — HOSPITAL ENCOUNTER (OUTPATIENT)
Dept: LAB | Age: 42
Discharge: STILL ADMITTED OR ADMINISTRATIVE | End: 2022-06-06
Attending: INTERNAL MEDICINE

## 2022-06-06 VITALS
BODY MASS INDEX: 21.21 KG/M2 | WEIGHT: 132 LBS | OXYGEN SATURATION: 100 % | DIASTOLIC BLOOD PRESSURE: 67 MMHG | TEMPERATURE: 98.4 F | HEART RATE: 72 BPM | SYSTOLIC BLOOD PRESSURE: 106 MMHG | HEIGHT: 66 IN

## 2022-06-06 DIAGNOSIS — C92.11 CML IN REMISSION (CMD): Primary | ICD-10-CM

## 2022-06-06 DIAGNOSIS — D64.9 ANEMIA, UNSPECIFIED TYPE: ICD-10-CM

## 2022-06-06 DIAGNOSIS — C92.11 CML IN REMISSION (CMD): ICD-10-CM

## 2022-06-06 LAB
ALBUMIN SERPL-MCNC: 4.1 G/DL (ref 3.6–5.1)
ALBUMIN/GLOB SERPL: 1.5 {RATIO} (ref 1–2.4)
ALP SERPL-CCNC: 56 UNITS/L (ref 45–117)
ALT SERPL-CCNC: 29 UNITS/L
ANION GAP SERPL CALC-SCNC: 8 MMOL/L (ref 7–19)
AST SERPL-CCNC: 29 UNITS/L
BASOPHILS # BLD: 0 K/MCL (ref 0–0.3)
BASOPHILS NFR BLD: 1 %
BILIRUB SERPL-MCNC: 0.5 MG/DL (ref 0.2–1)
BUN SERPL-MCNC: 10 MG/DL (ref 6–20)
BUN/CREAT SERPL: 17 (ref 7–25)
CALCIUM SERPL-MCNC: 9 MG/DL (ref 8.4–10.2)
CHLORIDE SERPL-SCNC: 109 MMOL/L (ref 97–110)
CO2 SERPL-SCNC: 27 MMOL/L (ref 21–32)
CREAT SERPL-MCNC: 0.59 MG/DL (ref 0.51–0.95)
DEPRECATED RDW RBC: 49.7 FL (ref 39–50)
EOSINOPHIL # BLD: 0 K/MCL (ref 0–0.5)
EOSINOPHIL NFR BLD: 1 %
ERYTHROCYTE [DISTWIDTH] IN BLOOD: 14 % (ref 11–15)
FASTING DURATION TIME PATIENT: NORMAL H
FERRITIN SERPL-MCNC: 31 NG/ML (ref 8–252)
FOLATE SERPL-MCNC: 8 NG/ML
GFR SERPLBLD BASED ON 1.73 SQ M-ARVRAT: >90 ML/MIN
GLOBULIN SER-MCNC: 2.8 G/DL (ref 2–4)
GLUCOSE SERPL-MCNC: 86 MG/DL (ref 70–99)
HCT VFR BLD CALC: 36.7 % (ref 36–46.5)
HGB BLD-MCNC: 11.9 G/DL (ref 12–15.5)
IMM GRANULOCYTES # BLD AUTO: 0 K/MCL (ref 0–0.2)
IMM GRANULOCYTES # BLD: 0 %
IRON SATN MFR SERPL: 14 % (ref 15–45)
IRON SERPL-MCNC: 47 MCG/DL (ref 50–170)
LYMPHOCYTES # BLD: 0.6 K/MCL (ref 1–4.8)
LYMPHOCYTES NFR BLD: 20 %
MCH RBC QN AUTO: 31.2 PG (ref 26–34)
MCHC RBC AUTO-ENTMCNC: 32.4 G/DL (ref 32–36.5)
MCV RBC AUTO: 96.3 FL (ref 78–100)
MONOCYTES # BLD: 0.3 K/MCL (ref 0.3–0.9)
MONOCYTES NFR BLD: 9 %
NEUTROPHILS # BLD: 2.2 K/MCL (ref 1.8–7.7)
NEUTROPHILS NFR BLD: 69 %
NRBC BLD MANUAL-RTO: 0 /100 WBC
PLATELET # BLD AUTO: 208 K/MCL (ref 140–450)
POTASSIUM SERPL-SCNC: 4 MMOL/L (ref 3.4–5.1)
PROT SERPL-MCNC: 6.9 G/DL (ref 6.4–8.2)
RBC # BLD: 3.81 MIL/MCL (ref 4–5.2)
SODIUM SERPL-SCNC: 140 MMOL/L (ref 135–145)
TIBC SERPL-MCNC: 335 MCG/DL (ref 250–450)
VIT B12 SERPL-MCNC: 690 PG/ML (ref 211–911)
WBC # BLD: 3.2 K/MCL (ref 4.2–11)

## 2022-06-06 PROCEDURE — 82728 ASSAY OF FERRITIN: CPT | Performed by: INTERNAL MEDICINE

## 2022-06-06 PROCEDURE — 36415 COLL VENOUS BLD VENIPUNCTURE: CPT | Performed by: INTERNAL MEDICINE

## 2022-06-06 PROCEDURE — 80053 COMPREHEN METABOLIC PANEL: CPT | Performed by: INTERNAL MEDICINE

## 2022-06-06 PROCEDURE — 85025 COMPLETE CBC W/AUTO DIFF WBC: CPT | Performed by: INTERNAL MEDICINE

## 2022-06-06 PROCEDURE — 83540 ASSAY OF IRON: CPT | Performed by: INTERNAL MEDICINE

## 2022-06-06 PROCEDURE — 99211 OFF/OP EST MAY X REQ PHY/QHP: CPT

## 2022-06-06 PROCEDURE — 81206 BCR/ABL1 GENE MAJOR BP: CPT | Performed by: INTERNAL MEDICINE

## 2022-06-06 PROCEDURE — 99214 OFFICE O/P EST MOD 30 MIN: CPT | Performed by: INTERNAL MEDICINE

## 2022-06-06 PROCEDURE — 82746 ASSAY OF FOLIC ACID SERUM: CPT | Performed by: INTERNAL MEDICINE

## 2022-06-06 ASSESSMENT — ENCOUNTER SYMPTOMS
ABDOMINAL PAIN: 0
COUGH: 0
CHILLS: 1
SLEEP DISTURBANCE: 0
BACK PAIN: 0
BLOOD IN STOOL: 0
APNEA: 0
ABDOMINAL DISTENTION: 0
CHEST TIGHTNESS: 0
HEADACHES: 1
DIAPHORESIS: 0
BRUISES/BLEEDS EASILY: 0
CONSTIPATION: 0
SPEECH DIFFICULTY: 0
CONFUSION: 0
WEAKNESS: 1
VOMITING: 0
LIGHT-HEADEDNESS: 0
DIARRHEA: 0
UNEXPECTED WEIGHT CHANGE: 0
FEVER: 1
ADENOPATHY: 0
CHOKING: 0
SORE THROAT: 1
DIZZINESS: 0
ACTIVITY CHANGE: 0
VOICE CHANGE: 1
APPETITE CHANGE: 0
WHEEZING: 0
TROUBLE SWALLOWING: 0
NAUSEA: 0
SHORTNESS OF BREATH: 0
FATIGUE: 1

## 2022-06-06 ASSESSMENT — PATIENT HEALTH QUESTIONNAIRE - PHQ9
SUM OF ALL RESPONSES TO PHQ9 QUESTIONS 1 AND 2: 1
SUM OF ALL RESPONSES TO PHQ9 QUESTIONS 1 AND 2: 1
CLINICAL INTERPRETATION OF PHQ2 SCORE: NO FURTHER SCREENING NEEDED
1. LITTLE INTEREST OR PLEASURE IN DOING THINGS: NOT AT ALL
2. FEELING DOWN, DEPRESSED OR HOPELESS: SEVERAL DAYS

## 2022-06-06 ASSESSMENT — PAIN SCALES - GENERAL: PAINLEVEL: 0

## 2022-06-09 LAB
BCR-ABL1, LOG REDUCTION - NUMERIC: 4.24
SERVICE CMNT-IMP: ABNORMAL
T(ABL1,BCR)P210 BLD/T QL: POSITIVE
T(ABL1,BCR)P210/CONTROL (IS) BLD/T: 0.01 %

## 2022-08-01 ENCOUNTER — HOSPITAL ENCOUNTER (OUTPATIENT)
Dept: LAB | Age: 42
Discharge: STILL ADMITTED OR ADMINISTRATIVE | End: 2022-08-01
Attending: INTERNAL MEDICINE

## 2022-08-01 ENCOUNTER — OFFICE VISIT (OUTPATIENT)
Dept: HEMATOLOGY/ONCOLOGY | Age: 42
End: 2022-08-01
Attending: INTERNAL MEDICINE

## 2022-08-01 VITALS
HEIGHT: 66 IN | HEART RATE: 75 BPM | WEIGHT: 135 LBS | TEMPERATURE: 98.3 F | OXYGEN SATURATION: 100 % | BODY MASS INDEX: 21.69 KG/M2 | SYSTOLIC BLOOD PRESSURE: 106 MMHG | DIASTOLIC BLOOD PRESSURE: 69 MMHG

## 2022-08-01 DIAGNOSIS — C92.11 CML IN REMISSION (CMD): ICD-10-CM

## 2022-08-01 DIAGNOSIS — D53.9 NUTRITIONAL ANEMIA, UNSPECIFIED: ICD-10-CM

## 2022-08-01 DIAGNOSIS — C92.11 CML IN REMISSION (CMD): Primary | ICD-10-CM

## 2022-08-01 LAB
ALBUMIN SERPL-MCNC: 3.9 G/DL (ref 3.6–5.1)
ALBUMIN/GLOB SERPL: 1.4 {RATIO} (ref 1–2.4)
ALP SERPL-CCNC: 55 UNITS/L (ref 45–117)
ALT SERPL-CCNC: 23 UNITS/L
ANION GAP SERPL CALC-SCNC: 8 MMOL/L (ref 7–19)
AST SERPL-CCNC: 26 UNITS/L
BASOPHILS # BLD: 0 K/MCL (ref 0–0.3)
BASOPHILS NFR BLD: 1 %
BILIRUB SERPL-MCNC: 0.5 MG/DL (ref 0.2–1)
BUN SERPL-MCNC: 12 MG/DL (ref 6–20)
BUN/CREAT SERPL: 19 (ref 7–25)
CALCIUM SERPL-MCNC: 9.3 MG/DL (ref 8.4–10.2)
CHLORIDE SERPL-SCNC: 110 MMOL/L (ref 97–110)
CO2 SERPL-SCNC: 27 MMOL/L (ref 21–32)
CREAT SERPL-MCNC: 0.62 MG/DL (ref 0.51–0.95)
DEPRECATED RDW RBC: 44.3 FL (ref 39–50)
EOSINOPHIL # BLD: 0.1 K/MCL (ref 0–0.5)
EOSINOPHIL NFR BLD: 2 %
ERYTHROCYTE [DISTWIDTH] IN BLOOD: 12.8 % (ref 11–15)
FASTING DURATION TIME PATIENT: ABNORMAL H
FERRITIN SERPL-MCNC: 32 NG/ML (ref 8–252)
FOLATE SERPL-MCNC: 11.2 NG/ML
GFR SERPLBLD BASED ON 1.73 SQ M-ARVRAT: >90 ML/MIN
GLOBULIN SER-MCNC: 2.8 G/DL (ref 2–4)
GLUCOSE SERPL-MCNC: 100 MG/DL (ref 70–99)
HCT VFR BLD CALC: 36.8 % (ref 36–46.5)
HGB BLD-MCNC: 12.3 G/DL (ref 12–15.5)
IMM GRANULOCYTES # BLD AUTO: 0 K/MCL (ref 0–0.2)
IMM GRANULOCYTES # BLD: 0 %
IRON SATN MFR SERPL: 16 % (ref 15–45)
IRON SERPL-MCNC: 52 MCG/DL (ref 50–170)
LYMPHOCYTES # BLD: 1 K/MCL (ref 1–4.8)
LYMPHOCYTES NFR BLD: 25 %
MCH RBC QN AUTO: 31.6 PG (ref 26–34)
MCHC RBC AUTO-ENTMCNC: 33.4 G/DL (ref 32–36.5)
MCV RBC AUTO: 94.6 FL (ref 78–100)
MONOCYTES # BLD: 0.3 K/MCL (ref 0.3–0.9)
MONOCYTES NFR BLD: 7 %
NEUTROPHILS # BLD: 2.6 K/MCL (ref 1.8–7.7)
NEUTROPHILS NFR BLD: 65 %
NRBC BLD MANUAL-RTO: 0 /100 WBC
PLATELET # BLD AUTO: 199 K/MCL (ref 140–450)
POTASSIUM SERPL-SCNC: 4.1 MMOL/L (ref 3.4–5.1)
PROT SERPL-MCNC: 6.7 G/DL (ref 6.4–8.2)
RBC # BLD: 3.89 MIL/MCL (ref 4–5.2)
SODIUM SERPL-SCNC: 141 MMOL/L (ref 135–145)
TIBC SERPL-MCNC: 328 MCG/DL (ref 250–450)
VIT B12 SERPL-MCNC: 815 PG/ML (ref 211–911)
WBC # BLD: 3.9 K/MCL (ref 4.2–11)

## 2022-08-01 PROCEDURE — 99214 OFFICE O/P EST MOD 30 MIN: CPT | Performed by: INTERNAL MEDICINE

## 2022-08-01 PROCEDURE — 99211 OFF/OP EST MAY X REQ PHY/QHP: CPT

## 2022-08-01 PROCEDURE — 83550 IRON BINDING TEST: CPT | Performed by: INTERNAL MEDICINE

## 2022-08-01 PROCEDURE — 80053 COMPREHEN METABOLIC PANEL: CPT | Performed by: INTERNAL MEDICINE

## 2022-08-01 PROCEDURE — 81206 BCR/ABL1 GENE MAJOR BP: CPT | Performed by: INTERNAL MEDICINE

## 2022-08-01 PROCEDURE — 85025 COMPLETE CBC W/AUTO DIFF WBC: CPT | Performed by: INTERNAL MEDICINE

## 2022-08-01 PROCEDURE — 82728 ASSAY OF FERRITIN: CPT | Performed by: INTERNAL MEDICINE

## 2022-08-01 PROCEDURE — 82746 ASSAY OF FOLIC ACID SERUM: CPT | Performed by: INTERNAL MEDICINE

## 2022-08-01 PROCEDURE — 36415 COLL VENOUS BLD VENIPUNCTURE: CPT | Performed by: INTERNAL MEDICINE

## 2022-08-01 ASSESSMENT — PATIENT HEALTH QUESTIONNAIRE - PHQ9
SUM OF ALL RESPONSES TO PHQ9 QUESTIONS 1 AND 2: 1
2. FEELING DOWN, DEPRESSED OR HOPELESS: SEVERAL DAYS
1. LITTLE INTEREST OR PLEASURE IN DOING THINGS: NOT AT ALL
CLINICAL INTERPRETATION OF PHQ2 SCORE: NO FURTHER SCREENING NEEDED
SUM OF ALL RESPONSES TO PHQ9 QUESTIONS 1 AND 2: 1

## 2022-08-01 ASSESSMENT — ENCOUNTER SYMPTOMS
HEADACHES: 1
TROUBLE SWALLOWING: 0
VOICE CHANGE: 1
WEAKNESS: 1
ADENOPATHY: 0
WHEEZING: 0
APNEA: 0
BACK PAIN: 0
UNEXPECTED WEIGHT CHANGE: 0
CONFUSION: 0
BRUISES/BLEEDS EASILY: 0
CHILLS: 1
VOMITING: 0
CONSTIPATION: 0
COUGH: 0
DIARRHEA: 0
LIGHT-HEADEDNESS: 0
BLOOD IN STOOL: 0
ABDOMINAL PAIN: 0
SORE THROAT: 1
SHORTNESS OF BREATH: 0
CHOKING: 0
FEVER: 1
ACTIVITY CHANGE: 0
SPEECH DIFFICULTY: 0
DIZZINESS: 0
SLEEP DISTURBANCE: 0
DIAPHORESIS: 0
ABDOMINAL DISTENTION: 0
NAUSEA: 0
APPETITE CHANGE: 0
FATIGUE: 1
CHEST TIGHTNESS: 0

## 2022-08-04 LAB
BCR-ABL1, LOG REDUCTION - NUMERIC: >4.7
SERVICE CMNT-IMP: ABNORMAL
T(ABL1,BCR)P210 BLD/T QL: ABNORMAL
T(ABL1,BCR)P210/CONTROL (IS) BLD/T: <0.002 %

## 2022-09-04 DIAGNOSIS — C92.11 CML IN REMISSION (CMD): ICD-10-CM

## 2022-09-06 RX ORDER — IMATINIB MESYLATE 400 MG/1
400 TABLET, FILM COATED ORAL DAILY
Qty: 30 TABLET | Refills: 3 | Status: SHIPPED | OUTPATIENT
Start: 2022-09-06 | End: 2022-12-27

## 2022-09-21 ENCOUNTER — HOSPITAL ENCOUNTER (OUTPATIENT)
Dept: LAB | Age: 42
Discharge: HOME | End: 2022-09-21
Attending: INTERNAL MEDICINE

## 2022-09-21 DIAGNOSIS — R53.83 OTHER FATIGUE: ICD-10-CM

## 2022-09-21 DIAGNOSIS — D64.9 ANEMIA, UNSPECIFIED: ICD-10-CM

## 2022-09-21 DIAGNOSIS — M04.1 PERIODIC FEVER SYNDROMES (CMD): ICD-10-CM

## 2022-09-21 DIAGNOSIS — I25.10 ATHEROSCLEROTIC HEART DISEASE OF NATIVE CORONARY ARTERY WITHOUT ANGINA PECTORIS: ICD-10-CM

## 2022-09-21 DIAGNOSIS — R53.81 OTHER MALAISE: ICD-10-CM

## 2022-09-21 DIAGNOSIS — J39.2 OTHER DISEASES OF PHARYNX: ICD-10-CM

## 2022-09-21 DIAGNOSIS — E83.40 DISORDERS OF MAGNESIUM METABOLISM, UNSPECIFIED: ICD-10-CM

## 2022-09-21 DIAGNOSIS — B75: ICD-10-CM

## 2022-09-21 DIAGNOSIS — B58.9 TOXOPLASMOSIS, UNSPECIFIED: ICD-10-CM

## 2022-09-21 DIAGNOSIS — E03.9 HYPOTHYROIDISM, UNSPECIFIED: ICD-10-CM

## 2022-09-21 DIAGNOSIS — E87.8 OTHER DISORDERS OF ELECTROLYTE AND FLUID BALANCE, NOT ELSEWHERE CLASSIFIED: Primary | ICD-10-CM

## 2022-09-21 DIAGNOSIS — D50.9 IRON DEFICIENCY ANEMIA, UNSPECIFIED: ICD-10-CM

## 2022-09-21 LAB
ALBUMIN SERPL-MCNC: 4 G/DL (ref 3.6–5.1)
ALBUMIN/GLOB SERPL: 1.3 {RATIO} (ref 1–2.4)
ALP SERPL-CCNC: 48 UNITS/L (ref 45–117)
ALT SERPL-CCNC: 26 UNITS/L
ANION GAP SERPL CALC-SCNC: 7 MMOL/L (ref 7–19)
AST SERPL-CCNC: 24 UNITS/L
BASOPHILS # BLD: 0 K/MCL (ref 0–0.3)
BASOPHILS NFR BLD: 1 %
BILIRUB SERPL-MCNC: 0.4 MG/DL (ref 0.2–1)
BUN SERPL-MCNC: 7 MG/DL (ref 6–20)
BUN/CREAT SERPL: 11 (ref 7–25)
CALCIUM SERPL-MCNC: 8.8 MG/DL (ref 8.4–10.2)
CHLORIDE SERPL-SCNC: 109 MMOL/L (ref 97–110)
CO2 SERPL-SCNC: 26 MMOL/L (ref 21–32)
CREAT SERPL-MCNC: 0.65 MG/DL (ref 0.51–0.95)
CRP SERPL-MCNC: <0.3 MG/DL
DEPRECATED RDW RBC: 46.5 FL (ref 39–50)
EOSINOPHIL # BLD: 0.1 K/MCL (ref 0–0.5)
EOSINOPHIL NFR BLD: 1 %
ERYTHROCYTE [DISTWIDTH] IN BLOOD: 13.4 % (ref 11–15)
ERYTHROCYTE [SEDIMENTATION RATE] IN BLOOD BY WESTERGREN METHOD: 5 MM/HR (ref 0–20)
FASTING DURATION TIME PATIENT: NORMAL H
FERRITIN SERPL-MCNC: 21 NG/ML (ref 8–252)
GFR SERPLBLD BASED ON 1.73 SQ M-ARVRAT: >90 ML/MIN
GLOBULIN SER-MCNC: 3 G/DL (ref 2–4)
GLUCOSE SERPL-MCNC: 91 MG/DL (ref 70–99)
HCT VFR BLD CALC: 36.6 % (ref 36–46.5)
HGB BLD-MCNC: 12 G/DL (ref 12–15.5)
IMM GRANULOCYTES # BLD AUTO: 0 K/MCL (ref 0–0.2)
IMM GRANULOCYTES # BLD: 0 %
IRON SATN MFR SERPL: 26 % (ref 15–45)
IRON SERPL-MCNC: 81 MCG/DL (ref 50–170)
LYMPHOCYTES # BLD: 1.1 K/MCL (ref 1–4.8)
LYMPHOCYTES NFR BLD: 25 %
MAGNESIUM SERPL-MCNC: 2.3 MG/DL (ref 1.7–2.4)
MCH RBC QN AUTO: 31.1 PG (ref 26–34)
MCHC RBC AUTO-ENTMCNC: 32.8 G/DL (ref 32–36.5)
MCV RBC AUTO: 94.8 FL (ref 78–100)
MONOCYTES # BLD: 0.3 K/MCL (ref 0.3–0.9)
MONOCYTES NFR BLD: 6 %
NEUTROPHILS # BLD: 2.8 K/MCL (ref 1.8–7.7)
NEUTROPHILS NFR BLD: 67 %
NRBC BLD MANUAL-RTO: 0 /100 WBC
PLATELET # BLD AUTO: 200 K/MCL (ref 140–450)
POTASSIUM SERPL-SCNC: 3.8 MMOL/L (ref 3.4–5.1)
PROT SERPL-MCNC: 7 G/DL (ref 6.4–8.2)
RBC # BLD: 3.86 MIL/MCL (ref 4–5.2)
SODIUM SERPL-SCNC: 138 MMOL/L (ref 135–145)
T GONDII IGG SER-ACNC: <0.5 UNITS/ML
T GONDII IGM SER-ACNC: <0.1 INDEX
T4 FREE SERPL-MCNC: 1 NG/DL (ref 0.8–1.5)
TIBC SERPL-MCNC: 317 MCG/DL (ref 250–450)
TSH SERPL-ACNC: 0.48 MCUNITS/ML (ref 0.35–5)
WBC # BLD: 4.3 K/MCL (ref 4.2–11)

## 2022-09-21 PROCEDURE — 82728 ASSAY OF FERRITIN: CPT | Performed by: INTERNAL MEDICINE

## 2022-09-21 PROCEDURE — 86778 TOXOPLASMA ANTIBODY IGM: CPT | Performed by: INTERNAL MEDICINE

## 2022-09-21 PROCEDURE — 84439 ASSAY OF FREE THYROXINE: CPT | Performed by: INTERNAL MEDICINE

## 2022-09-21 PROCEDURE — 86777 TOXOPLASMA ANTIBODY: CPT | Performed by: INTERNAL MEDICINE

## 2022-09-21 PROCEDURE — 84443 ASSAY THYROID STIM HORMONE: CPT | Performed by: INTERNAL MEDICINE

## 2022-09-21 PROCEDURE — 86060 ANTISTREPTOLYSIN O TITER: CPT | Performed by: INTERNAL MEDICINE

## 2022-09-21 PROCEDURE — 86784 TRICHINELLA ANTIBODY: CPT

## 2022-09-21 PROCEDURE — 85025 COMPLETE CBC W/AUTO DIFF WBC: CPT | Performed by: INTERNAL MEDICINE

## 2022-09-21 PROCEDURE — 80053 COMPREHEN METABOLIC PANEL: CPT | Performed by: INTERNAL MEDICINE

## 2022-09-21 PROCEDURE — 83735 ASSAY OF MAGNESIUM: CPT | Performed by: INTERNAL MEDICINE

## 2022-09-21 PROCEDURE — 36415 COLL VENOUS BLD VENIPUNCTURE: CPT | Performed by: INTERNAL MEDICINE

## 2022-09-21 PROCEDURE — 10000083 MISCELLANEOUS TEST

## 2022-09-21 PROCEDURE — 85652 RBC SED RATE AUTOMATED: CPT | Performed by: INTERNAL MEDICINE

## 2022-09-21 PROCEDURE — 83550 IRON BINDING TEST: CPT | Performed by: INTERNAL MEDICINE

## 2022-09-21 PROCEDURE — 86140 C-REACTIVE PROTEIN: CPT | Performed by: INTERNAL MEDICINE

## 2022-09-22 LAB — ASO AB SERPL-ACNC: 126 IUNITS/ML

## 2022-09-29 LAB
REF LAB TEST NAME: NORMAL
SERVICE CMNT-IMP: NORMAL

## 2022-09-30 DIAGNOSIS — C92.11 CML IN REMISSION (CMD): Primary | ICD-10-CM

## 2022-10-03 ENCOUNTER — HOSPITAL ENCOUNTER (OUTPATIENT)
Dept: LAB | Age: 42
Discharge: STILL ADMITTED OR ADMINISTRATIVE | End: 2022-10-03
Attending: INTERNAL MEDICINE

## 2022-10-03 ENCOUNTER — OFFICE VISIT (OUTPATIENT)
Dept: HEMATOLOGY/ONCOLOGY | Age: 42
End: 2022-10-03
Attending: INTERNAL MEDICINE

## 2022-10-03 VITALS
BODY MASS INDEX: 21.34 KG/M2 | OXYGEN SATURATION: 99 % | HEART RATE: 68 BPM | HEIGHT: 66 IN | DIASTOLIC BLOOD PRESSURE: 67 MMHG | SYSTOLIC BLOOD PRESSURE: 99 MMHG | TEMPERATURE: 98.1 F | WEIGHT: 132.8 LBS

## 2022-10-03 DIAGNOSIS — C92.11 CML IN REMISSION (CMD): ICD-10-CM

## 2022-10-03 DIAGNOSIS — C92.11 CML IN REMISSION (CMD): Primary | ICD-10-CM

## 2022-10-03 LAB
ALBUMIN SERPL-MCNC: 4.1 G/DL (ref 3.6–5.1)
ALBUMIN/GLOB SERPL: 1.5 {RATIO} (ref 1–2.4)
ALP SERPL-CCNC: 48 UNITS/L (ref 45–117)
ALT SERPL-CCNC: 26 UNITS/L
ANION GAP SERPL CALC-SCNC: 8 MMOL/L (ref 7–19)
AST SERPL-CCNC: 24 UNITS/L
BASOPHILS # BLD: 0 K/MCL (ref 0–0.3)
BASOPHILS NFR BLD: 1 %
BILIRUB SERPL-MCNC: 0.6 MG/DL (ref 0.2–1)
BUN SERPL-MCNC: 8 MG/DL (ref 6–20)
BUN/CREAT SERPL: 13 (ref 7–25)
CALCIUM SERPL-MCNC: 9 MG/DL (ref 8.4–10.2)
CHLORIDE SERPL-SCNC: 109 MMOL/L (ref 97–110)
CO2 SERPL-SCNC: 26 MMOL/L (ref 21–32)
CREAT SERPL-MCNC: 0.63 MG/DL (ref 0.51–0.95)
DEPRECATED RDW RBC: 47 FL (ref 39–50)
EOSINOPHIL # BLD: 0.1 K/MCL (ref 0–0.5)
EOSINOPHIL NFR BLD: 2 %
ERYTHROCYTE [DISTWIDTH] IN BLOOD: 13.7 % (ref 11–15)
FASTING DURATION TIME PATIENT: NORMAL H
FERRITIN SERPL-MCNC: 26 NG/ML (ref 8–252)
FOLATE SERPL-MCNC: 11.1 NG/ML
GFR SERPLBLD BASED ON 1.73 SQ M-ARVRAT: >90 ML/MIN
GLOBULIN SER-MCNC: 2.7 G/DL (ref 2–4)
GLUCOSE SERPL-MCNC: 84 MG/DL (ref 70–99)
HCT VFR BLD CALC: 36.1 % (ref 36–46.5)
HGB BLD-MCNC: 12.1 G/DL (ref 12–15.5)
IMM GRANULOCYTES # BLD AUTO: 0 K/MCL (ref 0–0.2)
IMM GRANULOCYTES # BLD: 0 %
IRON SATN MFR SERPL: 36 % (ref 15–45)
IRON SERPL-MCNC: 120 MCG/DL (ref 50–170)
LYMPHOCYTES # BLD: 1.2 K/MCL (ref 1–4.8)
LYMPHOCYTES NFR BLD: 31 %
MCH RBC QN AUTO: 31.5 PG (ref 26–34)
MCHC RBC AUTO-ENTMCNC: 33.5 G/DL (ref 32–36.5)
MCV RBC AUTO: 94 FL (ref 78–100)
MONOCYTES # BLD: 0.3 K/MCL (ref 0.3–0.9)
MONOCYTES NFR BLD: 8 %
NEUTROPHILS # BLD: 2.3 K/MCL (ref 1.8–7.7)
NEUTROPHILS NFR BLD: 58 %
NRBC BLD MANUAL-RTO: 0 /100 WBC
PLATELET # BLD AUTO: 215 K/MCL (ref 140–450)
POTASSIUM SERPL-SCNC: 4.3 MMOL/L (ref 3.4–5.1)
PROT SERPL-MCNC: 6.8 G/DL (ref 6.4–8.2)
RBC # BLD: 3.84 MIL/MCL (ref 4–5.2)
SODIUM SERPL-SCNC: 139 MMOL/L (ref 135–145)
TIBC SERPL-MCNC: 331 MCG/DL (ref 250–450)
VIT B12 SERPL-MCNC: 573 PG/ML (ref 211–911)
WBC # BLD: 3.9 K/MCL (ref 4.2–11)

## 2022-10-03 PROCEDURE — 82728 ASSAY OF FERRITIN: CPT | Performed by: INTERNAL MEDICINE

## 2022-10-03 PROCEDURE — 99215 OFFICE O/P EST HI 40 MIN: CPT | Performed by: INTERNAL MEDICINE

## 2022-10-03 PROCEDURE — 81206 BCR/ABL1 GENE MAJOR BP: CPT | Performed by: INTERNAL MEDICINE

## 2022-10-03 PROCEDURE — 99211 OFF/OP EST MAY X REQ PHY/QHP: CPT

## 2022-10-03 PROCEDURE — 85025 COMPLETE CBC W/AUTO DIFF WBC: CPT | Performed by: INTERNAL MEDICINE

## 2022-10-03 PROCEDURE — 80053 COMPREHEN METABOLIC PANEL: CPT | Performed by: INTERNAL MEDICINE

## 2022-10-03 PROCEDURE — 82746 ASSAY OF FOLIC ACID SERUM: CPT | Performed by: INTERNAL MEDICINE

## 2022-10-03 PROCEDURE — 36415 COLL VENOUS BLD VENIPUNCTURE: CPT | Performed by: INTERNAL MEDICINE

## 2022-10-03 PROCEDURE — 83550 IRON BINDING TEST: CPT | Performed by: INTERNAL MEDICINE

## 2022-10-03 RX ORDER — GABAPENTIN 100 MG/1
CAPSULE ORAL
COMMUNITY
Start: 2022-09-20

## 2022-10-03 ASSESSMENT — PAIN SCALES - GENERAL: PAINLEVEL: 0

## 2022-10-03 ASSESSMENT — ENCOUNTER SYMPTOMS
VOICE CHANGE: 1
CHILLS: 1
ACTIVITY CHANGE: 0
LIGHT-HEADEDNESS: 0
DIZZINESS: 0
BLOOD IN STOOL: 0
VOMITING: 0
FEVER: 1
BACK PAIN: 0
FATIGUE: 1
CONFUSION: 0
WHEEZING: 0
COUGH: 0
ABDOMINAL PAIN: 0
NAUSEA: 0
DIAPHORESIS: 0
SORE THROAT: 1
CHOKING: 0
CHEST TIGHTNESS: 0
HEADACHES: 1
UNEXPECTED WEIGHT CHANGE: 0
DIARRHEA: 0
TROUBLE SWALLOWING: 0
SHORTNESS OF BREATH: 0
WEAKNESS: 1
ABDOMINAL DISTENTION: 0
SPEECH DIFFICULTY: 0
APPETITE CHANGE: 0
ADENOPATHY: 0
APNEA: 0
SLEEP DISTURBANCE: 0
BRUISES/BLEEDS EASILY: 0
CONSTIPATION: 0

## 2022-10-06 LAB
BCR-ABL1, LOG REDUCTION - NUMERIC: 4.36
SERVICE CMNT-IMP: ABNORMAL
T(ABL1,BCR)P210 BLD/T QL: POSITIVE
T(ABL1,BCR)P210/CONTROL (IS) BLD/T: 0 %

## 2022-11-17 ENCOUNTER — HOSPITAL ENCOUNTER (OUTPATIENT)
Dept: ULTRASOUND IMAGING | Age: 42
Discharge: HOME | End: 2022-11-17
Attending: INTERNAL MEDICINE

## 2022-11-17 DIAGNOSIS — R10.32 ABDOMINAL PAIN, LEFT LOWER QUADRANT: ICD-10-CM

## 2022-11-17 PROCEDURE — 76830 TRANSVAGINAL US NON-OB: CPT

## 2022-11-23 DIAGNOSIS — C92.11 CML IN REMISSION (CMD): Primary | ICD-10-CM

## 2022-11-28 ENCOUNTER — HOSPITAL ENCOUNTER (OUTPATIENT)
Dept: LAB | Age: 42
Discharge: STILL ADMITTED OR ADMINISTRATIVE | End: 2022-11-28
Attending: INTERNAL MEDICINE

## 2022-11-28 ENCOUNTER — OFFICE VISIT (OUTPATIENT)
Dept: HEMATOLOGY/ONCOLOGY | Age: 42
End: 2022-11-28
Attending: INTERNAL MEDICINE

## 2022-11-28 VITALS
HEART RATE: 69 BPM | HEIGHT: 66 IN | WEIGHT: 136 LBS | SYSTOLIC BLOOD PRESSURE: 112 MMHG | TEMPERATURE: 98.7 F | DIASTOLIC BLOOD PRESSURE: 66 MMHG | BODY MASS INDEX: 21.86 KG/M2 | OXYGEN SATURATION: 100 %

## 2022-11-28 DIAGNOSIS — C92.11 CML IN REMISSION (CMD): Primary | ICD-10-CM

## 2022-11-28 DIAGNOSIS — D53.9 NUTRITIONAL ANEMIA, UNSPECIFIED: ICD-10-CM

## 2022-11-28 DIAGNOSIS — G44.89 OTHER HEADACHE SYNDROME: ICD-10-CM

## 2022-11-28 DIAGNOSIS — C92.11 CML IN REMISSION (CMD): ICD-10-CM

## 2022-11-28 LAB
ALBUMIN SERPL-MCNC: 3.5 G/DL (ref 3.6–5.1)
ALBUMIN/GLOB SERPL: 1.3 {RATIO} (ref 1–2.4)
ALP SERPL-CCNC: 51 UNITS/L (ref 45–117)
ALT SERPL-CCNC: 33 UNITS/L
ANION GAP SERPL CALC-SCNC: 6 MMOL/L (ref 7–19)
AST SERPL-CCNC: 24 UNITS/L
BASOPHILS # BLD: 0 K/MCL (ref 0–0.3)
BASOPHILS NFR BLD: 1 %
BILIRUB SERPL-MCNC: 0.4 MG/DL (ref 0.2–1)
BUN SERPL-MCNC: 9 MG/DL (ref 6–20)
BUN/CREAT SERPL: 16 (ref 7–25)
CALCIUM SERPL-MCNC: 8.1 MG/DL (ref 8.4–10.2)
CHLORIDE SERPL-SCNC: 110 MMOL/L (ref 97–110)
CO2 SERPL-SCNC: 28 MMOL/L (ref 21–32)
CREAT SERPL-MCNC: 0.57 MG/DL (ref 0.51–0.95)
DEPRECATED RDW RBC: 46.6 FL (ref 39–50)
EOSINOPHIL # BLD: 0.1 K/MCL (ref 0–0.5)
EOSINOPHIL NFR BLD: 2 %
ERYTHROCYTE [DISTWIDTH] IN BLOOD: 13.2 % (ref 11–15)
FASTING DURATION TIME PATIENT: ABNORMAL H
FERRITIN SERPL-MCNC: 18 NG/ML (ref 8–252)
FOLATE SERPL-MCNC: 15.8 NG/ML
GFR SERPLBLD BASED ON 1.73 SQ M-ARVRAT: >90 ML/MIN
GLOBULIN SER-MCNC: 2.7 G/DL (ref 2–4)
GLUCOSE SERPL-MCNC: 88 MG/DL (ref 70–99)
HCT VFR BLD CALC: 34.9 % (ref 36–46.5)
HGB BLD-MCNC: 11.5 G/DL (ref 12–15.5)
IMM GRANULOCYTES # BLD AUTO: 0 K/MCL (ref 0–0.2)
IMM GRANULOCYTES # BLD: 0 %
IRON SATN MFR SERPL: 23 % (ref 15–45)
IRON SERPL-MCNC: 73 MCG/DL (ref 50–170)
LYMPHOCYTES # BLD: 0.9 K/MCL (ref 1–4.8)
LYMPHOCYTES NFR BLD: 24 %
MCH RBC QN AUTO: 31.3 PG (ref 26–34)
MCHC RBC AUTO-ENTMCNC: 33 G/DL (ref 32–36.5)
MCV RBC AUTO: 94.8 FL (ref 78–100)
MONOCYTES # BLD: 0.4 K/MCL (ref 0.3–0.9)
MONOCYTES NFR BLD: 9 %
NEUTROPHILS # BLD: 2.5 K/MCL (ref 1.8–7.7)
NEUTROPHILS NFR BLD: 64 %
NRBC BLD MANUAL-RTO: 0 /100 WBC
PLATELET # BLD AUTO: 204 K/MCL (ref 140–450)
POTASSIUM SERPL-SCNC: 4 MMOL/L (ref 3.4–5.1)
PROT SERPL-MCNC: 6.2 G/DL (ref 6.4–8.2)
RAINBOW EXTRA TUBES HOLD SPECIMEN: NORMAL
RAINBOW EXTRA TUBES HOLD SPECIMEN: NORMAL
RBC # BLD: 3.68 MIL/MCL (ref 4–5.2)
SODIUM SERPL-SCNC: 140 MMOL/L (ref 135–145)
TIBC SERPL-MCNC: 320 MCG/DL (ref 250–450)
VIT B12 SERPL-MCNC: 754 PG/ML (ref 211–911)
WBC # BLD: 3.9 K/MCL (ref 4.2–11)

## 2022-11-28 PROCEDURE — 82746 ASSAY OF FOLIC ACID SERUM: CPT | Performed by: INTERNAL MEDICINE

## 2022-11-28 PROCEDURE — 99211 OFF/OP EST MAY X REQ PHY/QHP: CPT

## 2022-11-28 PROCEDURE — 85025 COMPLETE CBC W/AUTO DIFF WBC: CPT | Performed by: INTERNAL MEDICINE

## 2022-11-28 PROCEDURE — 82728 ASSAY OF FERRITIN: CPT | Performed by: INTERNAL MEDICINE

## 2022-11-28 PROCEDURE — 36415 COLL VENOUS BLD VENIPUNCTURE: CPT | Performed by: INTERNAL MEDICINE

## 2022-11-28 PROCEDURE — 83540 ASSAY OF IRON: CPT | Performed by: INTERNAL MEDICINE

## 2022-11-28 PROCEDURE — 80053 COMPREHEN METABOLIC PANEL: CPT | Performed by: INTERNAL MEDICINE

## 2022-11-28 PROCEDURE — 99215 OFFICE O/P EST HI 40 MIN: CPT | Performed by: INTERNAL MEDICINE

## 2022-11-28 ASSESSMENT — PATIENT HEALTH QUESTIONNAIRE - PHQ9
SUM OF ALL RESPONSES TO PHQ9 QUESTIONS 1 AND 2: 0
2. FEELING DOWN, DEPRESSED OR HOPELESS: NOT AT ALL
1. LITTLE INTEREST OR PLEASURE IN DOING THINGS: NOT AT ALL
SUM OF ALL RESPONSES TO PHQ9 QUESTIONS 1 AND 2: 0
CLINICAL INTERPRETATION OF PHQ2 SCORE: NO FURTHER SCREENING NEEDED

## 2022-11-28 ASSESSMENT — ENCOUNTER SYMPTOMS
APPETITE CHANGE: 0
BRUISES/BLEEDS EASILY: 0
HEADACHES: 1
LIGHT-HEADEDNESS: 0
SLEEP DISTURBANCE: 0
VOICE CHANGE: 1
DIARRHEA: 0
COUGH: 0
UNEXPECTED WEIGHT CHANGE: 0
WEAKNESS: 1
VOMITING: 0
BACK PAIN: 0
TROUBLE SWALLOWING: 0
SPEECH DIFFICULTY: 0
CHILLS: 1
FEVER: 1
ADENOPATHY: 0
DIZZINESS: 0
NAUSEA: 0
WHEEZING: 0
DIAPHORESIS: 0
ACTIVITY CHANGE: 0
CONSTIPATION: 0
APNEA: 0
ABDOMINAL DISTENTION: 0
FATIGUE: 1
SORE THROAT: 1
CHOKING: 0
CONFUSION: 0
BLOOD IN STOOL: 0
SHORTNESS OF BREATH: 0
ABDOMINAL PAIN: 0
CHEST TIGHTNESS: 0

## 2022-11-28 ASSESSMENT — PAIN SCALES - GENERAL: PAINLEVEL: 0

## 2022-12-23 DIAGNOSIS — C92.11 CML IN REMISSION (CMD): ICD-10-CM

## 2022-12-27 RX ORDER — IMATINIB MESYLATE 400 MG/1
400 TABLET, FILM COATED ORAL DAILY
Qty: 30 TABLET | Refills: 3 | Status: SHIPPED | OUTPATIENT
Start: 2022-12-27 | End: 2023-03-30 | Stop reason: SDUPTHER

## 2023-01-30 ENCOUNTER — OFFICE VISIT (OUTPATIENT)
Dept: HEMATOLOGY/ONCOLOGY | Age: 43
End: 2023-01-30
Attending: INTERNAL MEDICINE

## 2023-01-30 ENCOUNTER — HOSPITAL ENCOUNTER (OUTPATIENT)
Dept: LAB | Age: 43
Discharge: STILL ADMITTED OR ADMINISTRATIVE | End: 2023-01-30
Attending: INTERNAL MEDICINE

## 2023-01-30 VITALS
BODY MASS INDEX: 22.02 KG/M2 | SYSTOLIC BLOOD PRESSURE: 106 MMHG | HEIGHT: 66 IN | WEIGHT: 137 LBS | OXYGEN SATURATION: 98 % | TEMPERATURE: 98.8 F | HEART RATE: 74 BPM | DIASTOLIC BLOOD PRESSURE: 58 MMHG

## 2023-01-30 DIAGNOSIS — C92.11 CML IN REMISSION (CMD): ICD-10-CM

## 2023-01-30 DIAGNOSIS — C92.11 CML IN REMISSION (CMD): Primary | ICD-10-CM

## 2023-01-30 DIAGNOSIS — D53.9 NUTRITIONAL ANEMIA, UNSPECIFIED: ICD-10-CM

## 2023-01-30 LAB
ALBUMIN SERPL-MCNC: 3.9 G/DL (ref 3.6–5.1)
ALBUMIN/GLOB SERPL: 1.4 {RATIO} (ref 1–2.4)
ALP SERPL-CCNC: 52 UNITS/L (ref 45–117)
ALT SERPL-CCNC: 23 UNITS/L
ANION GAP SERPL CALC-SCNC: 9 MMOL/L (ref 7–19)
AST SERPL-CCNC: 18 UNITS/L
BASOPHILS # BLD: 0 K/MCL (ref 0–0.3)
BASOPHILS NFR BLD: 1 %
BILIRUB SERPL-MCNC: 0.5 MG/DL (ref 0.2–1)
BUN SERPL-MCNC: 9 MG/DL (ref 6–20)
BUN/CREAT SERPL: 16 (ref 7–25)
CALCIUM SERPL-MCNC: 8.6 MG/DL (ref 8.4–10.2)
CHLORIDE SERPL-SCNC: 108 MMOL/L (ref 97–110)
CO2 SERPL-SCNC: 26 MMOL/L (ref 21–32)
CREAT SERPL-MCNC: 0.58 MG/DL (ref 0.51–0.95)
DEPRECATED RDW RBC: 43.8 FL (ref 39–50)
EOSINOPHIL # BLD: 0.1 K/MCL (ref 0–0.5)
EOSINOPHIL NFR BLD: 1 %
ERYTHROCYTE [DISTWIDTH] IN BLOOD: 12.8 % (ref 11–15)
FASTING DURATION TIME PATIENT: NORMAL H
FERRITIN SERPL-MCNC: 26 NG/ML (ref 8–252)
FOLATE SERPL-MCNC: 11.7 NG/ML
GFR SERPLBLD BASED ON 1.73 SQ M-ARVRAT: >90 ML/MIN
GLOBULIN SER-MCNC: 2.8 G/DL (ref 2–4)
GLUCOSE SERPL-MCNC: 88 MG/DL (ref 70–99)
HCT VFR BLD CALC: 37.5 % (ref 36–46.5)
HGB BLD-MCNC: 12.4 G/DL (ref 12–15.5)
IMM GRANULOCYTES # BLD AUTO: 0 K/MCL (ref 0–0.2)
IMM GRANULOCYTES # BLD: 0 %
IRON SATN MFR SERPL: 33 % (ref 15–45)
IRON SERPL-MCNC: 105 MCG/DL (ref 50–170)
LYMPHOCYTES # BLD: 1.1 K/MCL (ref 1–4.8)
LYMPHOCYTES NFR BLD: 29 %
MCH RBC QN AUTO: 30.9 PG (ref 26–34)
MCHC RBC AUTO-ENTMCNC: 33.1 G/DL (ref 32–36.5)
MCV RBC AUTO: 93.5 FL (ref 78–100)
MONOCYTES # BLD: 0.3 K/MCL (ref 0.3–0.9)
MONOCYTES NFR BLD: 7 %
NEUTROPHILS # BLD: 2.3 K/MCL (ref 1.8–7.7)
NEUTROPHILS NFR BLD: 62 %
NRBC BLD MANUAL-RTO: 0 /100 WBC
PLATELET # BLD AUTO: 208 K/MCL (ref 140–450)
POTASSIUM SERPL-SCNC: 4.1 MMOL/L (ref 3.4–5.1)
PROT SERPL-MCNC: 6.7 G/DL (ref 6.4–8.2)
RBC # BLD: 4.01 MIL/MCL (ref 4–5.2)
SODIUM SERPL-SCNC: 139 MMOL/L (ref 135–145)
TIBC SERPL-MCNC: 321 MCG/DL (ref 250–450)
VIT B12 SERPL-MCNC: 805 PG/ML (ref 211–911)
WBC # BLD: 3.8 K/MCL (ref 4.2–11)

## 2023-01-30 PROCEDURE — 82728 ASSAY OF FERRITIN: CPT | Performed by: INTERNAL MEDICINE

## 2023-01-30 PROCEDURE — 99211 OFF/OP EST MAY X REQ PHY/QHP: CPT

## 2023-01-30 PROCEDURE — 80053 COMPREHEN METABOLIC PANEL: CPT | Performed by: INTERNAL MEDICINE

## 2023-01-30 PROCEDURE — 85025 COMPLETE CBC W/AUTO DIFF WBC: CPT | Performed by: INTERNAL MEDICINE

## 2023-01-30 PROCEDURE — 82746 ASSAY OF FOLIC ACID SERUM: CPT | Performed by: INTERNAL MEDICINE

## 2023-01-30 PROCEDURE — 36415 COLL VENOUS BLD VENIPUNCTURE: CPT | Performed by: INTERNAL MEDICINE

## 2023-01-30 PROCEDURE — 81206 BCR/ABL1 GENE MAJOR BP: CPT | Performed by: INTERNAL MEDICINE

## 2023-01-30 PROCEDURE — 83550 IRON BINDING TEST: CPT | Performed by: INTERNAL MEDICINE

## 2023-01-30 PROCEDURE — 99215 OFFICE O/P EST HI 40 MIN: CPT | Performed by: INTERNAL MEDICINE

## 2023-01-30 ASSESSMENT — ENCOUNTER SYMPTOMS
TROUBLE SWALLOWING: 0
ADENOPATHY: 0
BACK PAIN: 0
CONFUSION: 0
CHILLS: 1
ABDOMINAL PAIN: 0
HEADACHES: 1
CHEST TIGHTNESS: 0
CHOKING: 0
WEAKNESS: 1
CONSTIPATION: 0
APPETITE CHANGE: 0
BRUISES/BLEEDS EASILY: 0
BLOOD IN STOOL: 0
SHORTNESS OF BREATH: 0
WHEEZING: 0
FATIGUE: 1
SLEEP DISTURBANCE: 0
VOMITING: 0
NAUSEA: 0
SORE THROAT: 1
ABDOMINAL DISTENTION: 0
COUGH: 0
LIGHT-HEADEDNESS: 0
APNEA: 0
DIZZINESS: 0
UNEXPECTED WEIGHT CHANGE: 0
ACTIVITY CHANGE: 0
FEVER: 1
VOICE CHANGE: 1
SPEECH DIFFICULTY: 0
DIAPHORESIS: 0
DIARRHEA: 0

## 2023-01-30 ASSESSMENT — PAIN SCALES - GENERAL: PAINLEVEL: 0

## 2023-01-30 ASSESSMENT — PATIENT HEALTH QUESTIONNAIRE - PHQ9
2. FEELING DOWN, DEPRESSED OR HOPELESS: NOT AT ALL
SUM OF ALL RESPONSES TO PHQ9 QUESTIONS 1 AND 2: 0
1. LITTLE INTEREST OR PLEASURE IN DOING THINGS: NOT AT ALL
CLINICAL INTERPRETATION OF PHQ2 SCORE: NO FURTHER SCREENING NEEDED
SUM OF ALL RESPONSES TO PHQ9 QUESTIONS 1 AND 2: 0

## 2023-02-02 LAB
BCR-ABL1, LOG REDUCTION - NUMERIC: 4.16
SERVICE CMNT-IMP: ABNORMAL
T(ABL1,BCR)P210 BLD/T QL: POSITIVE
T(ABL1,BCR)P210/CONTROL (IS) BLD/T: 0.01 %

## 2023-03-30 DIAGNOSIS — C92.11 CML IN REMISSION (CMD): Primary | ICD-10-CM

## 2023-03-30 DIAGNOSIS — D64.9 ANEMIA, UNSPECIFIED TYPE: ICD-10-CM

## 2023-03-30 DIAGNOSIS — C92.11 CML IN REMISSION (CMD): ICD-10-CM

## 2023-03-30 RX ORDER — IMATINIB MESYLATE 400 MG/1
400 TABLET, FILM COATED ORAL DAILY
Qty: 90 TABLET | Refills: 1 | Status: SHIPPED | OUTPATIENT
Start: 2023-03-30 | End: 2023-04-05 | Stop reason: SDUPTHER

## 2023-04-03 ENCOUNTER — OFFICE VISIT (OUTPATIENT)
Dept: HEMATOLOGY/ONCOLOGY | Age: 43
End: 2023-04-03
Attending: INTERNAL MEDICINE

## 2023-04-03 ENCOUNTER — HOSPITAL ENCOUNTER (OUTPATIENT)
Dept: LAB | Age: 43
Discharge: STILL ADMITTED OR ADMINISTRATIVE | End: 2023-04-03
Attending: INTERNAL MEDICINE

## 2023-04-03 VITALS
BODY MASS INDEX: 22.16 KG/M2 | WEIGHT: 137.9 LBS | HEART RATE: 76 BPM | OXYGEN SATURATION: 97 % | SYSTOLIC BLOOD PRESSURE: 103 MMHG | TEMPERATURE: 98.5 F | DIASTOLIC BLOOD PRESSURE: 62 MMHG | HEIGHT: 66 IN

## 2023-04-03 DIAGNOSIS — Z20.828 EBV EXPOSURE: Primary | ICD-10-CM

## 2023-04-03 DIAGNOSIS — C92.11 CML IN REMISSION (CMD): ICD-10-CM

## 2023-04-03 DIAGNOSIS — D64.9 ANEMIA, UNSPECIFIED TYPE: ICD-10-CM

## 2023-04-03 DIAGNOSIS — Z20.828 EBV EXPOSURE: ICD-10-CM

## 2023-04-03 LAB
ALBUMIN SERPL-MCNC: 3.9 G/DL (ref 3.6–5.1)
ALBUMIN/GLOB SERPL: 1.4 {RATIO} (ref 1–2.4)
ALP SERPL-CCNC: 53 UNITS/L (ref 45–117)
ALT SERPL-CCNC: 27 UNITS/L
ANION GAP SERPL CALC-SCNC: 8 MMOL/L (ref 7–19)
AST SERPL-CCNC: 20 UNITS/L
BASOPHILS # BLD: 0 K/MCL (ref 0–0.3)
BASOPHILS NFR BLD: 1 %
BILIRUB SERPL-MCNC: 0.6 MG/DL (ref 0.2–1)
BUN SERPL-MCNC: 13 MG/DL (ref 6–20)
BUN/CREAT SERPL: 18 (ref 7–25)
CALCIUM SERPL-MCNC: 8.5 MG/DL (ref 8.4–10.2)
CHLORIDE SERPL-SCNC: 109 MMOL/L (ref 97–110)
CO2 SERPL-SCNC: 27 MMOL/L (ref 21–32)
CREAT SERPL-MCNC: 0.73 MG/DL (ref 0.51–0.95)
DEPRECATED RDW RBC: 47.4 FL (ref 39–50)
EOSINOPHIL # BLD: 0.1 K/MCL (ref 0–0.5)
EOSINOPHIL NFR BLD: 2 %
ERYTHROCYTE [DISTWIDTH] IN BLOOD: 13.3 % (ref 11–15)
FASTING DURATION TIME PATIENT: ABNORMAL H
FERRITIN SERPL-MCNC: 53 NG/ML (ref 8–252)
GFR SERPLBLD BASED ON 1.73 SQ M-ARVRAT: >90 ML/MIN
GLOBULIN SER-MCNC: 2.7 G/DL (ref 2–4)
GLUCOSE SERPL-MCNC: 115 MG/DL (ref 70–99)
HCT VFR BLD CALC: 37.4 % (ref 36–46.5)
HGB BLD-MCNC: 12.2 G/DL (ref 12–15.5)
IMM GRANULOCYTES # BLD AUTO: 0 K/MCL (ref 0–0.2)
IMM GRANULOCYTES # BLD: 0 %
IRON SATN MFR SERPL: 38 % (ref 15–45)
IRON SERPL-MCNC: 125 MCG/DL (ref 50–170)
LYMPHOCYTES # BLD: 1.1 K/MCL (ref 1–4.8)
LYMPHOCYTES NFR BLD: 37 %
MCH RBC QN AUTO: 31.3 PG (ref 26–34)
MCHC RBC AUTO-ENTMCNC: 32.6 G/DL (ref 32–36.5)
MCV RBC AUTO: 95.9 FL (ref 78–100)
MONOCYTES # BLD: 0.2 K/MCL (ref 0.3–0.9)
MONOCYTES NFR BLD: 6 %
NEUTROPHILS # BLD: 1.6 K/MCL (ref 1.8–7.7)
NEUTROPHILS NFR BLD: 54 %
NRBC BLD MANUAL-RTO: 0 /100 WBC
PLATELET # BLD AUTO: 198 K/MCL (ref 140–450)
POTASSIUM SERPL-SCNC: 3.7 MMOL/L (ref 3.4–5.1)
PROT SERPL-MCNC: 6.6 G/DL (ref 6.4–8.2)
RBC # BLD: 3.9 MIL/MCL (ref 4–5.2)
SODIUM SERPL-SCNC: 140 MMOL/L (ref 135–145)
TIBC SERPL-MCNC: 325 MCG/DL (ref 250–450)
WBC # BLD: 3 K/MCL (ref 4.2–11)

## 2023-04-03 PROCEDURE — 36415 COLL VENOUS BLD VENIPUNCTURE: CPT | Performed by: INTERNAL MEDICINE

## 2023-04-03 PROCEDURE — 82728 ASSAY OF FERRITIN: CPT | Performed by: INTERNAL MEDICINE

## 2023-04-03 PROCEDURE — 86665 EPSTEIN-BARR CAPSID VCA: CPT | Performed by: INTERNAL MEDICINE

## 2023-04-03 PROCEDURE — 82746 ASSAY OF FOLIC ACID SERUM: CPT | Performed by: INTERNAL MEDICINE

## 2023-04-03 PROCEDURE — 99215 OFFICE O/P EST HI 40 MIN: CPT | Performed by: INTERNAL MEDICINE

## 2023-04-03 PROCEDURE — 99211 OFF/OP EST MAY X REQ PHY/QHP: CPT

## 2023-04-03 PROCEDURE — 86663 EPSTEIN-BARR ANTIBODY: CPT | Performed by: INTERNAL MEDICINE

## 2023-04-03 PROCEDURE — 81206 BCR/ABL1 GENE MAJOR BP: CPT | Performed by: INTERNAL MEDICINE

## 2023-04-03 PROCEDURE — 83540 ASSAY OF IRON: CPT | Performed by: INTERNAL MEDICINE

## 2023-04-03 PROCEDURE — 80053 COMPREHEN METABOLIC PANEL: CPT | Performed by: INTERNAL MEDICINE

## 2023-04-03 PROCEDURE — 85025 COMPLETE CBC W/AUTO DIFF WBC: CPT | Performed by: INTERNAL MEDICINE

## 2023-04-03 ASSESSMENT — ENCOUNTER SYMPTOMS
ADENOPATHY: 0
CONFUSION: 0
TROUBLE SWALLOWING: 0
CHOKING: 0
FEVER: 1
SLEEP DISTURBANCE: 0
COUGH: 0
SPEECH DIFFICULTY: 0
CONSTIPATION: 0
VOICE CHANGE: 1
DIARRHEA: 0
UNEXPECTED WEIGHT CHANGE: 0
LIGHT-HEADEDNESS: 0
DIAPHORESIS: 0
BACK PAIN: 0
WEAKNESS: 1
APPETITE CHANGE: 0
VOMITING: 0
SORE THROAT: 1
SHORTNESS OF BREATH: 0
CHEST TIGHTNESS: 0
NAUSEA: 0
HEADACHES: 1
APNEA: 0
CHILLS: 1
WHEEZING: 0
FATIGUE: 1
BLOOD IN STOOL: 0
ABDOMINAL PAIN: 0
BRUISES/BLEEDS EASILY: 0
ACTIVITY CHANGE: 0
DIZZINESS: 0
ABDOMINAL DISTENTION: 0

## 2023-04-03 ASSESSMENT — PATIENT HEALTH QUESTIONNAIRE - PHQ9
CLINICAL INTERPRETATION OF PHQ2 SCORE: NO FURTHER SCREENING NEEDED
SUM OF ALL RESPONSES TO PHQ9 QUESTIONS 1 AND 2: 0
1. LITTLE INTEREST OR PLEASURE IN DOING THINGS: NOT AT ALL
SUM OF ALL RESPONSES TO PHQ9 QUESTIONS 1 AND 2: 0
2. FEELING DOWN, DEPRESSED OR HOPELESS: NOT AT ALL

## 2023-04-03 ASSESSMENT — PAIN SCALES - GENERAL: PAINLEVEL: 0

## 2023-04-04 LAB
EBV EA IGG SER-ACNC: <0.2 AI
EBV NA IGG SER-ACNC: >8 AI
EBV VCA IGG SER-ACNC: 7.4 AI
EBV VCA IGM SER-ACNC: 0.4 AI
FOLATE SERPL-MCNC: 13.3 NG/ML
VIT B12 SERPL-MCNC: 700 PG/ML (ref 211–911)

## 2023-04-05 ENCOUNTER — TELEPHONE (OUTPATIENT)
Dept: HEMATOLOGY/ONCOLOGY | Age: 43
End: 2023-04-05

## 2023-04-05 DIAGNOSIS — C92.11 CML IN REMISSION (CMD): ICD-10-CM

## 2023-04-05 LAB
BCR-ABL1, LOG REDUCTION - NUMERIC: 4.06
SERVICE CMNT-IMP: ABNORMAL
T(ABL1,BCR)P210 BLD/T QL: POSITIVE
T(ABL1,BCR)P210/CONTROL (IS) BLD/T: 0.01 %

## 2023-04-05 RX ORDER — IMATINIB MESYLATE 400 MG/1
400 TABLET, FILM COATED ORAL DAILY
Qty: 90 TABLET | Refills: 1 | Status: SHIPPED | OUTPATIENT
Start: 2023-04-05 | End: 2023-04-06 | Stop reason: SDUPTHER

## 2023-04-06 DIAGNOSIS — C92.11 CML IN REMISSION (CMD): ICD-10-CM

## 2023-04-06 RX ORDER — IMATINIB MESYLATE 400 MG/1
400 TABLET, FILM COATED ORAL DAILY
Qty: 90 TABLET | Refills: 0 | Status: SHIPPED | OUTPATIENT
Start: 2023-04-06 | End: 2023-06-16

## 2023-06-05 ENCOUNTER — LAB SERVICES (OUTPATIENT)
Dept: LAB | Age: 43
End: 2023-06-05
Attending: INTERNAL MEDICINE

## 2023-06-05 ENCOUNTER — OFFICE VISIT (OUTPATIENT)
Dept: HEMATOLOGY/ONCOLOGY | Age: 43
End: 2023-06-05
Attending: INTERNAL MEDICINE

## 2023-06-05 VITALS
OXYGEN SATURATION: 99 % | SYSTOLIC BLOOD PRESSURE: 100 MMHG | HEIGHT: 66 IN | WEIGHT: 132.7 LBS | HEART RATE: 77 BPM | BODY MASS INDEX: 21.33 KG/M2 | TEMPERATURE: 97.8 F | DIASTOLIC BLOOD PRESSURE: 64 MMHG

## 2023-06-05 DIAGNOSIS — C92.11 CML IN REMISSION (CMD): ICD-10-CM

## 2023-06-05 DIAGNOSIS — D53.9 NUTRITIONAL ANEMIA, UNSPECIFIED: ICD-10-CM

## 2023-06-05 DIAGNOSIS — C92.11 CML IN REMISSION (CMD): Primary | ICD-10-CM

## 2023-06-05 LAB
ALBUMIN SERPL-MCNC: 4.1 G/DL (ref 3.6–5.1)
ALBUMIN/GLOB SERPL: 1.6 {RATIO} (ref 1–2.4)
ALP SERPL-CCNC: 51 UNITS/L (ref 45–117)
ALT SERPL-CCNC: 30 UNITS/L
ANION GAP SERPL CALC-SCNC: 5 MMOL/L (ref 7–19)
AST SERPL-CCNC: 32 UNITS/L
BASOPHILS # BLD: 0 K/MCL (ref 0–0.3)
BASOPHILS NFR BLD: 1 %
BILIRUB SERPL-MCNC: 0.5 MG/DL (ref 0.2–1)
BUN SERPL-MCNC: 10 MG/DL (ref 6–20)
BUN/CREAT SERPL: 14 (ref 7–25)
CALCIUM SERPL-MCNC: 8.7 MG/DL (ref 8.4–10.2)
CHLORIDE SERPL-SCNC: 111 MMOL/L (ref 97–110)
CO2 SERPL-SCNC: 30 MMOL/L (ref 21–32)
CREAT SERPL-MCNC: 0.73 MG/DL (ref 0.51–0.95)
DEPRECATED RDW RBC: 46.3 FL (ref 39–50)
EOSINOPHIL # BLD: 0.1 K/MCL (ref 0–0.5)
EOSINOPHIL NFR BLD: 2 %
ERYTHROCYTE [DISTWIDTH] IN BLOOD: 13 % (ref 11–15)
FASTING DURATION TIME PATIENT: ABNORMAL H
FERRITIN SERPL-MCNC: 43 NG/ML (ref 8–252)
GFR SERPLBLD BASED ON 1.73 SQ M-ARVRAT: >90 ML/MIN
GLOBULIN SER-MCNC: 2.5 G/DL (ref 2–4)
GLUCOSE SERPL-MCNC: 86 MG/DL (ref 70–99)
HCT VFR BLD CALC: 37.8 % (ref 36–46.5)
HGB BLD-MCNC: 12.3 G/DL (ref 12–15.5)
IMM GRANULOCYTES # BLD AUTO: 0 K/MCL (ref 0–0.2)
IMM GRANULOCYTES # BLD: 0 %
IRON SATN MFR SERPL: 36 % (ref 15–45)
IRON SERPL-MCNC: 113 MCG/DL (ref 50–170)
LYMPHOCYTES # BLD: 1 K/MCL (ref 1–4.8)
LYMPHOCYTES NFR BLD: 36 %
MCH RBC QN AUTO: 31.3 PG (ref 26–34)
MCHC RBC AUTO-ENTMCNC: 32.5 G/DL (ref 32–36.5)
MCV RBC AUTO: 96.2 FL (ref 78–100)
MONOCYTES # BLD: 0.2 K/MCL (ref 0.3–0.9)
MONOCYTES NFR BLD: 7 %
NEUTROPHILS # BLD: 1.6 K/MCL (ref 1.8–7.7)
NEUTROPHILS NFR BLD: 54 %
NRBC BLD MANUAL-RTO: 0 /100 WBC
PLATELET # BLD AUTO: 227 K/MCL (ref 140–450)
POTASSIUM SERPL-SCNC: 4.2 MMOL/L (ref 3.4–5.1)
PROT SERPL-MCNC: 6.6 G/DL (ref 6.4–8.2)
RBC # BLD: 3.93 MIL/MCL (ref 4–5.2)
SODIUM SERPL-SCNC: 142 MMOL/L (ref 135–145)
TIBC SERPL-MCNC: 313 MCG/DL (ref 250–450)
WBC # BLD: 2.9 K/MCL (ref 4.2–11)

## 2023-06-05 PROCEDURE — 99211 OFF/OP EST MAY X REQ PHY/QHP: CPT

## 2023-06-05 PROCEDURE — 81206 BCR/ABL1 GENE MAJOR BP: CPT

## 2023-06-05 PROCEDURE — 85025 COMPLETE CBC W/AUTO DIFF WBC: CPT

## 2023-06-05 PROCEDURE — 99215 OFFICE O/P EST HI 40 MIN: CPT | Performed by: INTERNAL MEDICINE

## 2023-06-05 PROCEDURE — 82728 ASSAY OF FERRITIN: CPT

## 2023-06-05 PROCEDURE — 80053 COMPREHEN METABOLIC PANEL: CPT

## 2023-06-05 PROCEDURE — 36415 COLL VENOUS BLD VENIPUNCTURE: CPT

## 2023-06-05 PROCEDURE — 83550 IRON BINDING TEST: CPT

## 2023-06-05 ASSESSMENT — ENCOUNTER SYMPTOMS
FEVER: 1
ABDOMINAL PAIN: 0
WEAKNESS: 1
CHILLS: 1
COUGH: 0
CONSTIPATION: 0
CONFUSION: 0
CHEST TIGHTNESS: 0
SHORTNESS OF BREATH: 0
ADENOPATHY: 0
VOMITING: 0
APPETITE CHANGE: 0
FATIGUE: 1
DIZZINESS: 0
BLOOD IN STOOL: 0
VOICE CHANGE: 1
APNEA: 0
ACTIVITY CHANGE: 0
HEADACHES: 1
NAUSEA: 0
BACK PAIN: 0
TROUBLE SWALLOWING: 0
UNEXPECTED WEIGHT CHANGE: 0
BRUISES/BLEEDS EASILY: 0
SPEECH DIFFICULTY: 0
ABDOMINAL DISTENTION: 0
SORE THROAT: 1
SLEEP DISTURBANCE: 0
WHEEZING: 0
LIGHT-HEADEDNESS: 0
DIARRHEA: 0
CHOKING: 0
DIAPHORESIS: 0

## 2023-06-05 ASSESSMENT — PAIN SCALES - GENERAL: PAINLEVEL: 0

## 2023-06-16 DIAGNOSIS — C92.11 CML IN REMISSION (CMD): ICD-10-CM

## 2023-06-16 RX ORDER — IMATINIB MESYLATE 400 MG/1
TABLET, FILM COATED ORAL
Qty: 90 TABLET | Refills: 0 | Status: SHIPPED | OUTPATIENT
Start: 2023-06-16 | End: 2023-08-11 | Stop reason: SDUPTHER

## 2023-06-20 ENCOUNTER — TELEPHONE (OUTPATIENT)
Dept: HEMATOLOGY/ONCOLOGY | Age: 43
End: 2023-06-20

## 2023-06-20 DIAGNOSIS — B00.1 COLD SORE: Primary | ICD-10-CM

## 2023-06-20 RX ORDER — VALACYCLOVIR HYDROCHLORIDE 1 G/1
1000 TABLET, FILM COATED ORAL 2 TIMES DAILY
Qty: 14 TABLET | Refills: 0 | Status: SHIPPED | OUTPATIENT
Start: 2023-06-20 | End: 2023-06-27

## 2023-08-02 DIAGNOSIS — C92.11 CML IN REMISSION (CMD): Primary | ICD-10-CM

## 2023-08-07 ENCOUNTER — E-ADVICE (OUTPATIENT)
Dept: HEMATOLOGY/ONCOLOGY | Age: 43
End: 2023-08-07

## 2023-08-11 ENCOUNTER — LAB SERVICES (OUTPATIENT)
Dept: LAB | Age: 43
End: 2023-08-11
Attending: INTERNAL MEDICINE

## 2023-08-11 ENCOUNTER — OFFICE VISIT (OUTPATIENT)
Dept: HEMATOLOGY/ONCOLOGY | Age: 43
End: 2023-08-11
Attending: INTERNAL MEDICINE

## 2023-08-11 VITALS
HEIGHT: 66 IN | TEMPERATURE: 98.5 F | WEIGHT: 135.8 LBS | BODY MASS INDEX: 21.83 KG/M2 | OXYGEN SATURATION: 96 % | HEART RATE: 82 BPM | DIASTOLIC BLOOD PRESSURE: 62 MMHG | SYSTOLIC BLOOD PRESSURE: 110 MMHG

## 2023-08-11 DIAGNOSIS — C92.11 CML IN REMISSION (CMD): ICD-10-CM

## 2023-08-11 DIAGNOSIS — Z20.828 EBV EXPOSURE: Primary | ICD-10-CM

## 2023-08-11 DIAGNOSIS — Z20.828 EBV EXPOSURE: ICD-10-CM

## 2023-08-11 LAB
ALBUMIN SERPL-MCNC: 4.2 G/DL (ref 3.6–5.1)
ALBUMIN/GLOB SERPL: 1.3 {RATIO} (ref 1–2.4)
ALP SERPL-CCNC: 59 UNITS/L (ref 45–117)
ALT SERPL-CCNC: 30 UNITS/L
ANION GAP SERPL CALC-SCNC: 11 MMOL/L (ref 7–19)
AST SERPL-CCNC: 28 UNITS/L
BASOPHILS # BLD: 0 K/MCL (ref 0–0.3)
BASOPHILS NFR BLD: 1 %
BILIRUB SERPL-MCNC: 0.4 MG/DL (ref 0.2–1)
BUN SERPL-MCNC: 13 MG/DL (ref 6–20)
BUN/CREAT SERPL: 18 (ref 7–25)
CALCIUM SERPL-MCNC: 8.7 MG/DL (ref 8.4–10.2)
CHLORIDE SERPL-SCNC: 109 MMOL/L (ref 97–110)
CO2 SERPL-SCNC: 25 MMOL/L (ref 21–32)
CREAT SERPL-MCNC: 0.71 MG/DL (ref 0.51–0.95)
DEPRECATED RDW RBC: 45.9 FL (ref 39–50)
EOSINOPHIL # BLD: 0.1 K/MCL (ref 0–0.5)
EOSINOPHIL NFR BLD: 3 %
ERYTHROCYTE [DISTWIDTH] IN BLOOD: 13 % (ref 11–15)
FASTING DURATION TIME PATIENT: NORMAL H
FERRITIN SERPL-MCNC: 38 NG/ML (ref 8–252)
GFR SERPLBLD BASED ON 1.73 SQ M-ARVRAT: >90 ML/MIN
GLOBULIN SER-MCNC: 3.2 G/DL (ref 2–4)
GLUCOSE SERPL-MCNC: 83 MG/DL (ref 70–99)
HCT VFR BLD CALC: 37.8 % (ref 36–46.5)
HGB BLD-MCNC: 12.5 G/DL (ref 12–15.5)
IMM GRANULOCYTES # BLD AUTO: 0 K/MCL (ref 0–0.2)
IMM GRANULOCYTES # BLD: 0 %
IRON SATN MFR SERPL: 28 % (ref 15–45)
IRON SERPL-MCNC: 99 MCG/DL (ref 50–170)
LYMPHOCYTES # BLD: 1.1 K/MCL (ref 1–4.8)
LYMPHOCYTES NFR BLD: 31 %
MCH RBC QN AUTO: 31.6 PG (ref 26–34)
MCHC RBC AUTO-ENTMCNC: 33.1 G/DL (ref 32–36.5)
MCV RBC AUTO: 95.7 FL (ref 78–100)
MONOCYTES # BLD: 0.2 K/MCL (ref 0.3–0.9)
MONOCYTES NFR BLD: 6 %
NEUTROPHILS # BLD: 2 K/MCL (ref 1.8–7.7)
NEUTROPHILS NFR BLD: 59 %
NRBC BLD MANUAL-RTO: 0 /100 WBC
PLATELET # BLD AUTO: 204 K/MCL (ref 140–450)
POTASSIUM SERPL-SCNC: 3.9 MMOL/L (ref 3.4–5.1)
PROT SERPL-MCNC: 7.4 G/DL (ref 6.4–8.2)
RBC # BLD: 3.95 MIL/MCL (ref 4–5.2)
SODIUM SERPL-SCNC: 141 MMOL/L (ref 135–145)
TIBC SERPL-MCNC: 354 MCG/DL (ref 250–450)
WBC # BLD: 3.4 K/MCL (ref 4.2–11)

## 2023-08-11 PROCEDURE — 81206 BCR/ABL1 GENE MAJOR BP: CPT

## 2023-08-11 PROCEDURE — 99215 OFFICE O/P EST HI 40 MIN: CPT | Performed by: INTERNAL MEDICINE

## 2023-08-11 PROCEDURE — 86663 EPSTEIN-BARR ANTIBODY: CPT

## 2023-08-11 PROCEDURE — 80053 COMPREHEN METABOLIC PANEL: CPT

## 2023-08-11 PROCEDURE — 99211 OFF/OP EST MAY X REQ PHY/QHP: CPT

## 2023-08-11 PROCEDURE — 85025 COMPLETE CBC W/AUTO DIFF WBC: CPT

## 2023-08-11 PROCEDURE — 86665 EPSTEIN-BARR CAPSID VCA: CPT

## 2023-08-11 PROCEDURE — 82728 ASSAY OF FERRITIN: CPT

## 2023-08-11 PROCEDURE — 83540 ASSAY OF IRON: CPT

## 2023-08-11 RX ORDER — IMATINIB MESYLATE 400 MG/1
400 TABLET, FILM COATED ORAL DAILY
Qty: 90 TABLET | Refills: 0 | Status: SHIPPED | OUTPATIENT
Start: 2023-08-11

## 2023-08-11 ASSESSMENT — ENCOUNTER SYMPTOMS
FATIGUE: 1
DIZZINESS: 0
FEVER: 1
ADENOPATHY: 0
CONSTIPATION: 0
COUGH: 0
ABDOMINAL DISTENTION: 0
DIARRHEA: 0
UNEXPECTED WEIGHT CHANGE: 0
VOICE CHANGE: 1
HEADACHES: 1
BRUISES/BLEEDS EASILY: 0
CONFUSION: 0
WEAKNESS: 1
BLOOD IN STOOL: 0
SPEECH DIFFICULTY: 0
SLEEP DISTURBANCE: 0
TROUBLE SWALLOWING: 0
CHILLS: 1
DIAPHORESIS: 0
BACK PAIN: 0
WHEEZING: 0
APPETITE CHANGE: 0
ABDOMINAL PAIN: 0
SHORTNESS OF BREATH: 0
CHOKING: 0
NAUSEA: 0
SORE THROAT: 1
ACTIVITY CHANGE: 0
LIGHT-HEADEDNESS: 0
CHEST TIGHTNESS: 0
VOMITING: 0
APNEA: 0

## 2023-08-11 ASSESSMENT — PATIENT HEALTH QUESTIONNAIRE - PHQ9
2. FEELING DOWN, DEPRESSED OR HOPELESS: NOT AT ALL
CLINICAL INTERPRETATION OF PHQ2 SCORE: NO FURTHER SCREENING NEEDED
SUM OF ALL RESPONSES TO PHQ9 QUESTIONS 1 AND 2: 0
1. LITTLE INTEREST OR PLEASURE IN DOING THINGS: NOT AT ALL
SUM OF ALL RESPONSES TO PHQ9 QUESTIONS 1 AND 2: 0

## 2023-08-11 ASSESSMENT — PAIN SCALES - GENERAL: PAINLEVEL: 0

## 2023-08-14 LAB
BCR-ABL1, LOG REDUCTION - NUMERIC: 4.46
EBV EA IGG SER-ACNC: <0.2 AI
EBV NA IGG SER-ACNC: >8 AI
EBV VCA IGG SER-ACNC: >8 AI
EBV VCA IGM SER-ACNC: 0.4 AI
SERVICE CMNT-IMP: ABNORMAL
T(ABL1,BCR)P210 BLD/T QL: POSITIVE
T(ABL1,BCR)P210/CONTROL (IS) BLD/T: 0 %

## 2023-09-13 ENCOUNTER — TELEPHONE (OUTPATIENT)
Dept: HEMATOLOGY/ONCOLOGY | Age: 43
End: 2023-09-13

## 2023-09-30 ENCOUNTER — OFFICE VISIT (OUTPATIENT)
Dept: FAMILY MEDICINE CLINIC | Facility: CLINIC | Age: 43
End: 2023-09-30

## 2023-09-30 VITALS
HEIGHT: 66 IN | OXYGEN SATURATION: 99 % | RESPIRATION RATE: 20 BRPM | DIASTOLIC BLOOD PRESSURE: 66 MMHG | WEIGHT: 139 LBS | SYSTOLIC BLOOD PRESSURE: 106 MMHG | BODY MASS INDEX: 22.34 KG/M2 | HEART RATE: 65 BPM

## 2023-09-30 DIAGNOSIS — M54.9 OTHER CHRONIC BACK PAIN: ICD-10-CM

## 2023-09-30 DIAGNOSIS — G89.29 OTHER CHRONIC BACK PAIN: ICD-10-CM

## 2023-09-30 DIAGNOSIS — Z00.00 ANNUAL PHYSICAL EXAM: Primary | ICD-10-CM

## 2023-09-30 PROCEDURE — 3078F DIAST BP <80 MM HG: CPT | Performed by: FAMILY MEDICINE

## 2023-09-30 PROCEDURE — 3074F SYST BP LT 130 MM HG: CPT | Performed by: FAMILY MEDICINE

## 2023-09-30 PROCEDURE — 3008F BODY MASS INDEX DOCD: CPT | Performed by: FAMILY MEDICINE

## 2023-09-30 PROCEDURE — 99386 PREV VISIT NEW AGE 40-64: CPT | Performed by: FAMILY MEDICINE

## 2023-09-30 RX ORDER — DULOXETIN HYDROCHLORIDE 20 MG/1
20 CAPSULE, DELAYED RELEASE ORAL DAILY
Qty: 90 CAPSULE | Refills: 0 | Status: SHIPPED | OUTPATIENT
Start: 2023-09-30

## 2023-09-30 NOTE — PROGRESS NOTES
Subjective:   Patient ID: Tom Arauz is a 37year old female. HPI  New patient   Has familial mediterranean fever   Tried different medication did not help much   Anxious and worried about son's health   Has pain in the back shoulder everything hurts and then once a mointh usually low grade fever also     History/Other:   Review of Systems   Constitutional:  Positive for fatigue. Negative for activity change, appetite change and unexpected weight change. Respiratory:  Negative for cough, chest tightness and shortness of breath. Cardiovascular: Negative. Negative for chest pain and leg swelling. Gastrointestinal:         Bloated   Musculoskeletal:  Positive for back pain. Psychiatric/Behavioral:  Positive for dysphoric mood. Negative for agitation and sleep disturbance. The patient is nervous/anxious. Current Outpatient Medications   Medication Sig Dispense Refill    DULoxetine 20 MG Oral Cap DR Particles Take 1 capsule (20 mg total) by mouth daily. 90 capsule 0    Imatinib Mesylate 400 MG Oral Tab Take 1 tablet (400 mg total) by mouth daily. Diclofenac Sodium 75 MG Oral Tab EC Take 75 mg by mouth 2 (two) times daily. (Patient not taking: Reported on 9/30/2023)      colchicine 0.6 MG Oral Tab Take 0.6 mg by mouth daily. (Patient not taking: Reported on 3/8/2021)       Allergies:  Pcn [Penicillins]       ANAPHYLAXIS    Objective:   Physical Exam  Constitutional:       Appearance: She is well-developed. Cardiovascular:      Rate and Rhythm: Normal rate and regular rhythm. Heart sounds: Normal heart sounds. No murmur heard. No gallop. Pulmonary:      Effort: Pulmonary effort is normal. No respiratory distress. Breath sounds: Normal breath sounds. No wheezing. Abdominal:      General: There is no distension. Tenderness: There is no abdominal tenderness. Musculoskeletal:         General: Tenderness present. No deformity. Normal range of motion.    Neurological: Deep Tendon Reflexes: Reflexes are normal and symmetric. Assessment & Plan:   (Z00.00) Annual physical exam  (primary encounter diagnosis)  Plan:   (M54.9,  G89.29) Other chronic back pain  Plan: trial of duloxetine   F/u in 1 month        No orders of the defined types were placed in this encounter. Meds This Visit:  Requested Prescriptions     Signed Prescriptions Disp Refills    DULoxetine 20 MG Oral Cap DR Particles 90 capsule 0     Sig: Take 1 capsule (20 mg total) by mouth daily.        Imaging & Referrals:  None

## 2023-10-03 ENCOUNTER — NURSE TRIAGE (OUTPATIENT)
Dept: FAMILY MEDICINE CLINIC | Facility: CLINIC | Age: 43
End: 2023-10-03

## 2023-10-03 NOTE — TELEPHONE ENCOUNTER
I told her to make an appointment but now its taken .  So should do it for next TUesday or some other day this week   Only if this pain is chronic and on and off   If its new she should go to ic

## 2023-10-03 NOTE — TELEPHONE ENCOUNTER
Spoke with patient (name and  verified). Dr. Maddox Star recommendations discussed. Patient verbalized understanding and appointment scheduled. Patient states the pain is on and off,  not constant. States she tried making the appointment for today when she was leaving the office on  but no one was at the .      Future Appointments   Date Time Provider Marc Khoury   10/10/2023  6:10 PM Kris Umanzor MD [facility]

## 2023-10-03 NOTE — TELEPHONE ENCOUNTER
Dr. Eveline Quintero to advise [630 pm visit today?]  Please reply to pool: EM RN TRIAGE  Action Requested: Summary for Provider     []  Critical Lab, Recommendations Needed  [x] Need Additional Advice  []   FYI    []   Need Orders  [] Need Medications Sent to Pharmacy  []  Other     SUMMARY: Patient called states she has some intermittent abdominal pain, she mentioned she told Dr. Eveline Quintero and she was told if continues she will be able to see patient at 630 pm today as the last patient so she can spend time with patient. Abdominal pain is lower and bilateral --> pain is 4-5/10 --> intermittent in severity but constant. Denies any fever. Denies any nausea, vomiting. Had diarrhea yesterday about 3-4 times. Denies any blood in the stool or black stool. Scleras are white. No chance of pregnancy. Denies any vaginal discharge. Denies any tenderness at RLQ. Patient is drinking about 2 L each day. Denies any shortness of breath, chest pain, and/or chest tightness. Home Care Advice discussed, per protocol. Same day visit advised --> Dr. Eveline Quintero to advise if patient should come in at 630 pm today [per patient] -- office visit not scheduled. Patient instructed any new or worsening symptoms [reviewed] seek immediate medical attention. Patient verbalized understanding. No further questions or concerns at this time.     Reason for call: Abdominal Pain  Onset: yesterday    Reason for Disposition   MODERATE pain (e.g., interferes with normal activities that comes and goes (cramps) lasts > 24 hours  (Exception: Pain with Vomiting or Diarrhea - see that Protocol.)    Protocols used: Abdominal Pain - Female-A-[place]

## 2023-10-10 ENCOUNTER — OFFICE VISIT (OUTPATIENT)
Dept: FAMILY MEDICINE CLINIC | Facility: CLINIC | Age: 43
End: 2023-10-10

## 2023-10-10 VITALS
BODY MASS INDEX: 22.5 KG/M2 | HEIGHT: 66 IN | SYSTOLIC BLOOD PRESSURE: 100 MMHG | RESPIRATION RATE: 20 BRPM | DIASTOLIC BLOOD PRESSURE: 61 MMHG | HEART RATE: 71 BPM | WEIGHT: 140 LBS | OXYGEN SATURATION: 98 %

## 2023-10-10 DIAGNOSIS — R10.9 ABDOMINAL PAIN, UNSPECIFIED ABDOMINAL LOCATION: Primary | ICD-10-CM

## 2023-10-10 PROCEDURE — 97811 ACUP 1/> W/O ESTIM EA ADD 15: CPT | Performed by: FAMILY MEDICINE

## 2023-10-10 PROCEDURE — 99213 OFFICE O/P EST LOW 20 MIN: CPT | Performed by: FAMILY MEDICINE

## 2023-10-10 PROCEDURE — 97810 ACUP 1/> WO ESTIM 1ST 15 MIN: CPT | Performed by: FAMILY MEDICINE

## 2023-10-10 PROCEDURE — 3008F BODY MASS INDEX DOCD: CPT | Performed by: FAMILY MEDICINE

## 2023-10-10 PROCEDURE — 3074F SYST BP LT 130 MM HG: CPT | Performed by: FAMILY MEDICINE

## 2023-10-10 PROCEDURE — 3078F DIAST BP <80 MM HG: CPT | Performed by: FAMILY MEDICINE

## 2023-10-11 NOTE — PROGRESS NOTES
Subjective:   Patient ID: Luiz Murdock is a 37year old female. HPI  Patient is here for feeling tired and abdominal bloating   This has been ongoing for quite some time    History/Other:   Review of Systems  Constitutional: Negative. Negative for activity change, appetite change, diaphoresis and fatigue. Respiratory: Negative. Negative for apnea, cough, chest tightness and shortness of breath. Cardiovascular: Negative. Negative for chest pain, palpitations and leg swelling. Gastrointestinal: Negative. Negative for abdominal pain. Skin: Negative. abdomen bloating     Current Outpatient Medications   Medication Sig Dispense Refill    DULoxetine 20 MG Oral Cap DR Particles Take 1 capsule (20 mg total) by mouth daily. 90 capsule 0    Imatinib Mesylate 400 MG Oral Tab Take 1 tablet (400 mg total) by mouth daily. Allergies:  Pcn [Penicillins]       ANAPHYLAXIS    Objective:   Physical Exam  Constitutional:       Appearance: She is well-developed. Cardiovascular:      Rate and Rhythm: Normal rate and regular rhythm. Heart sounds: Normal heart sounds. Pulmonary:      Effort: Pulmonary effort is normal.      Breath sounds: Normal breath sounds. Abdominal:      Tenderness: There is abdominal tenderness. Comments: Periumbilical tendernss    Neurological:      Mental Status: She is alert. Deep Tendon Reflexes: Reflexes are normal and symmetric. Assessment & Plan:     (R10.9) Abdominal pain, unspecified abdominal location  (primary encounter diagnosis)  Plan: appears functional  F/u in 1 week for acupuncture      No orders of the defined types were placed in this encounter.       Meds This Visit:  Requested Prescriptions      No prescriptions requested or ordered in this encounter       Imaging & Referrals:  None

## 2023-10-11 NOTE — PROCEDURES
Patient tolerated procedure well in excess of 30 minutes was spent with patient   There was no complications all needles were removed.    Patient was advised to rest today and f/u in 1-2 weeks  [facility]

## 2023-10-16 DIAGNOSIS — C92.11 CML IN REMISSION (CMD): ICD-10-CM

## 2023-10-16 DIAGNOSIS — Z20.828 EBV EXPOSURE: Primary | ICD-10-CM

## 2023-10-27 ENCOUNTER — OFFICE VISIT (OUTPATIENT)
Dept: HEMATOLOGY/ONCOLOGY | Age: 43
End: 2023-10-27
Attending: INTERNAL MEDICINE

## 2023-10-27 ENCOUNTER — LAB SERVICES (OUTPATIENT)
Dept: LAB | Age: 43
End: 2023-10-27
Attending: INTERNAL MEDICINE

## 2023-10-27 VITALS
HEART RATE: 95 BPM | OXYGEN SATURATION: 100 % | RESPIRATION RATE: 17 BRPM | SYSTOLIC BLOOD PRESSURE: 102 MMHG | WEIGHT: 139.2 LBS | TEMPERATURE: 99.7 F | DIASTOLIC BLOOD PRESSURE: 64 MMHG | BODY MASS INDEX: 22.37 KG/M2 | HEIGHT: 66 IN

## 2023-10-27 DIAGNOSIS — C92.11 CML IN REMISSION (CMD): ICD-10-CM

## 2023-10-27 DIAGNOSIS — Z20.828 EBV EXPOSURE: ICD-10-CM

## 2023-10-27 DIAGNOSIS — C92.11 CML IN REMISSION (CMD): Primary | ICD-10-CM

## 2023-10-27 LAB
ALBUMIN SERPL-MCNC: 3.9 G/DL (ref 3.6–5.1)
ALBUMIN/GLOB SERPL: 1.1 {RATIO} (ref 1–2.4)
ALP SERPL-CCNC: 53 UNITS/L (ref 45–117)
ALT SERPL-CCNC: 28 UNITS/L
ANION GAP SERPL CALC-SCNC: 8 MMOL/L (ref 7–19)
AST SERPL-CCNC: 27 UNITS/L
BASOPHILS # BLD: 0 K/MCL (ref 0–0.3)
BASOPHILS NFR BLD: 1 %
BILIRUB SERPL-MCNC: 0.5 MG/DL (ref 0.2–1)
BUN SERPL-MCNC: 14 MG/DL (ref 6–20)
BUN/CREAT SERPL: 23 (ref 7–25)
CALCIUM SERPL-MCNC: 8.7 MG/DL (ref 8.4–10.2)
CHLORIDE SERPL-SCNC: 109 MMOL/L (ref 97–110)
CO2 SERPL-SCNC: 25 MMOL/L (ref 21–32)
CREAT SERPL-MCNC: 0.62 MG/DL (ref 0.51–0.95)
DEPRECATED RDW RBC: 46.1 FL (ref 39–50)
EGFRCR SERPLBLD CKD-EPI 2021: >90 ML/MIN/{1.73_M2}
EOSINOPHIL # BLD: 0.2 K/MCL (ref 0–0.5)
EOSINOPHIL NFR BLD: 4 %
ERYTHROCYTE [DISTWIDTH] IN BLOOD: 13 % (ref 11–15)
FASTING DURATION TIME PATIENT: NORMAL H
FERRITIN SERPL-MCNC: 33 NG/ML (ref 8–252)
GLOBULIN SER-MCNC: 3.4 G/DL (ref 2–4)
GLUCOSE SERPL-MCNC: 84 MG/DL (ref 70–99)
HCT VFR BLD CALC: 40.9 % (ref 36–46.5)
HGB BLD-MCNC: 13.3 G/DL (ref 12–15.5)
IMM GRANULOCYTES # BLD AUTO: 0 K/MCL (ref 0–0.2)
IMM GRANULOCYTES # BLD: 0 %
IRON SATN MFR SERPL: 34 % (ref 15–45)
IRON SERPL-MCNC: 116 MCG/DL (ref 50–170)
LYMPHOCYTES # BLD: 1.2 K/MCL (ref 1–4.8)
LYMPHOCYTES NFR BLD: 29 %
MCH RBC QN AUTO: 31.3 PG (ref 26–34)
MCHC RBC AUTO-ENTMCNC: 32.5 G/DL (ref 32–36.5)
MCV RBC AUTO: 96.2 FL (ref 78–100)
MONOCYTES # BLD: 0.2 K/MCL (ref 0.3–0.9)
MONOCYTES NFR BLD: 5 %
NEUTROPHILS # BLD: 2.5 K/MCL (ref 1.8–7.7)
NEUTROPHILS NFR BLD: 61 %
NRBC BLD MANUAL-RTO: 0 /100 WBC
PLATELET # BLD AUTO: 248 K/MCL (ref 140–450)
POTASSIUM SERPL-SCNC: 4.2 MMOL/L (ref 3.4–5.1)
PROT SERPL-MCNC: 7.3 G/DL (ref 6.4–8.2)
RBC # BLD: 4.25 MIL/MCL (ref 4–5.2)
SODIUM SERPL-SCNC: 138 MMOL/L (ref 135–145)
TIBC SERPL-MCNC: 345 MCG/DL (ref 250–450)
WBC # BLD: 4.1 K/MCL (ref 4.2–11)

## 2023-10-27 PROCEDURE — 86665 EPSTEIN-BARR CAPSID VCA: CPT

## 2023-10-27 PROCEDURE — 85025 COMPLETE CBC W/AUTO DIFF WBC: CPT

## 2023-10-27 PROCEDURE — 80053 COMPREHEN METABOLIC PANEL: CPT

## 2023-10-27 PROCEDURE — 36415 COLL VENOUS BLD VENIPUNCTURE: CPT

## 2023-10-27 PROCEDURE — 99215 OFFICE O/P EST HI 40 MIN: CPT | Performed by: INTERNAL MEDICINE

## 2023-10-27 PROCEDURE — 81206 BCR/ABL1 GENE MAJOR BP: CPT

## 2023-10-27 PROCEDURE — 86663 EPSTEIN-BARR ANTIBODY: CPT

## 2023-10-27 PROCEDURE — 82728 ASSAY OF FERRITIN: CPT

## 2023-10-27 PROCEDURE — 83540 ASSAY OF IRON: CPT

## 2023-10-27 PROCEDURE — 99211 OFF/OP EST MAY X REQ PHY/QHP: CPT

## 2023-10-27 ASSESSMENT — ENCOUNTER SYMPTOMS
CHILLS: 1
WHEEZING: 0
ADENOPATHY: 0
WEAKNESS: 1
TROUBLE SWALLOWING: 0
CHOKING: 0
VOICE CHANGE: 1
DIAPHORESIS: 0
DIARRHEA: 0
LIGHT-HEADEDNESS: 0
VOMITING: 0
CONFUSION: 0
SORE THROAT: 1
SPEECH DIFFICULTY: 0
APPETITE CHANGE: 0
FEVER: 1
UNEXPECTED WEIGHT CHANGE: 0
DIZZINESS: 0
CONSTIPATION: 0
FATIGUE: 1
ACTIVITY CHANGE: 0
NAUSEA: 0
BLOOD IN STOOL: 0
BACK PAIN: 0
ABDOMINAL DISTENTION: 0
SLEEP DISTURBANCE: 0
SHORTNESS OF BREATH: 0
ABDOMINAL PAIN: 0
CHEST TIGHTNESS: 0
COUGH: 0
HEADACHES: 1
APNEA: 0
BRUISES/BLEEDS EASILY: 0

## 2023-10-27 ASSESSMENT — PAIN SCALES - GENERAL: PAINLEVEL: 4

## 2023-10-27 ASSESSMENT — PATIENT HEALTH QUESTIONNAIRE - PHQ9
SUM OF ALL RESPONSES TO PHQ9 QUESTIONS 1 AND 2: 0
2. FEELING DOWN, DEPRESSED OR HOPELESS: NOT AT ALL
CLINICAL INTERPRETATION OF PHQ2 SCORE: NO FURTHER SCREENING NEEDED
SUM OF ALL RESPONSES TO PHQ9 QUESTIONS 1 AND 2: 0
1. LITTLE INTEREST OR PLEASURE IN DOING THINGS: NOT AT ALL

## 2023-10-30 LAB
SERVICE CMNT-IMP: ABNORMAL
T(9;22)(ABL1,BCR)/CONTROL BLD/T-LNRTO: 4.35 LOG
T(ABL1,BCR)P210 BLD/T QL: POSITIVE
T(ABL1,BCR)P210/CONTROL (IS) BLD/T: 0 %

## 2023-11-08 LAB
EBV EA IGG SER-ACNC: <0.2 AI
EBV NA IGG SER-ACNC: >8 AI
EBV VCA IGG SER-ACNC: >8 AI
EBV VCA IGM SER-ACNC: 0.4 AI

## 2023-12-01 DIAGNOSIS — C92.11 CML IN REMISSION (CMD): ICD-10-CM

## 2023-12-01 RX ORDER — IMATINIB MESYLATE 400 MG/1
400 TABLET, FILM COATED ORAL DAILY
Qty: 90 TABLET | Refills: 1 | Status: SHIPPED | OUTPATIENT
Start: 2023-12-01

## 2023-12-29 ENCOUNTER — E-ADVICE (OUTPATIENT)
Dept: HEMATOLOGY/ONCOLOGY | Age: 43
End: 2023-12-29

## 2024-01-04 DIAGNOSIS — Z20.828 EBV EXPOSURE: ICD-10-CM

## 2024-01-04 DIAGNOSIS — C92.11 CML IN REMISSION (CMD): Primary | ICD-10-CM

## 2024-01-05 ENCOUNTER — E-ADVICE (OUTPATIENT)
Dept: HEMATOLOGY/ONCOLOGY | Age: 44
End: 2024-01-05

## 2024-01-26 ENCOUNTER — APPOINTMENT (OUTPATIENT)
Dept: LAB | Age: 44
End: 2024-01-26
Attending: INTERNAL MEDICINE

## 2024-01-26 ENCOUNTER — APPOINTMENT (OUTPATIENT)
Dept: HEMATOLOGY/ONCOLOGY | Age: 44
End: 2024-01-26
Attending: INTERNAL MEDICINE

## 2024-01-31 ENCOUNTER — LAB SERVICES (OUTPATIENT)
Dept: LAB | Age: 44
End: 2024-01-31
Attending: INTERNAL MEDICINE

## 2024-01-31 ENCOUNTER — OFFICE VISIT (OUTPATIENT)
Dept: HEMATOLOGY/ONCOLOGY | Age: 44
End: 2024-01-31
Attending: INTERNAL MEDICINE

## 2024-01-31 VITALS
BODY MASS INDEX: 23.5 KG/M2 | TEMPERATURE: 98.4 F | SYSTOLIC BLOOD PRESSURE: 111 MMHG | DIASTOLIC BLOOD PRESSURE: 65 MMHG | RESPIRATION RATE: 15 BRPM | OXYGEN SATURATION: 99 % | HEIGHT: 66 IN | HEART RATE: 78 BPM | WEIGHT: 146.2 LBS

## 2024-01-31 DIAGNOSIS — D64.9 ANEMIA, UNSPECIFIED TYPE: Primary | ICD-10-CM

## 2024-01-31 DIAGNOSIS — D64.9 ANEMIA, UNSPECIFIED TYPE: ICD-10-CM

## 2024-01-31 DIAGNOSIS — Z20.828 EBV EXPOSURE: ICD-10-CM

## 2024-01-31 DIAGNOSIS — C92.11 CML IN REMISSION (CMD): ICD-10-CM

## 2024-01-31 LAB
ALBUMIN SERPL-MCNC: 4.3 G/DL (ref 3.6–5.1)
ALBUMIN/GLOB SERPL: 1.5 {RATIO} (ref 1–2.4)
ALP SERPL-CCNC: 50 UNITS/L (ref 45–117)
ALT SERPL-CCNC: 27 UNITS/L
ANION GAP SERPL CALC-SCNC: 8 MMOL/L (ref 7–19)
AST SERPL-CCNC: 24 UNITS/L
BASOPHILS # BLD: 0 K/MCL (ref 0–0.3)
BASOPHILS NFR BLD: 1 %
BILIRUB SERPL-MCNC: 0.3 MG/DL (ref 0.2–1)
BUN SERPL-MCNC: 9 MG/DL (ref 6–20)
BUN/CREAT SERPL: 15 (ref 7–25)
CALCIUM SERPL-MCNC: 9 MG/DL (ref 8.4–10.2)
CHLORIDE SERPL-SCNC: 108 MMOL/L (ref 97–110)
CO2 SERPL-SCNC: 27 MMOL/L (ref 21–32)
CREAT SERPL-MCNC: 0.6 MG/DL (ref 0.51–0.95)
DEPRECATED RDW RBC: 44.4 FL (ref 39–50)
EGFRCR SERPLBLD CKD-EPI 2021: >90 ML/MIN/{1.73_M2}
EOSINOPHIL # BLD: 0.1 K/MCL (ref 0–0.5)
EOSINOPHIL NFR BLD: 1 %
ERYTHROCYTE [DISTWIDTH] IN BLOOD: 12.8 % (ref 11–15)
FASTING DURATION TIME PATIENT: NORMAL H
FERRITIN SERPL-MCNC: 39 NG/ML (ref 8–252)
GLOBULIN SER-MCNC: 2.8 G/DL (ref 2–4)
GLUCOSE SERPL-MCNC: 96 MG/DL (ref 70–99)
HCT VFR BLD CALC: 34.8 % (ref 36–46.5)
HGB BLD-MCNC: 11.4 G/DL (ref 12–15.5)
IMM GRANULOCYTES # BLD AUTO: 0 K/MCL (ref 0–0.2)
IMM GRANULOCYTES # BLD: 0 %
IRON SATN MFR SERPL: 24 % (ref 15–45)
IRON SERPL-MCNC: 79 MCG/DL (ref 50–170)
LYMPHOCYTES # BLD: 1.4 K/MCL (ref 1–4.8)
LYMPHOCYTES NFR BLD: 30 %
MCH RBC QN AUTO: 30.8 PG (ref 26–34)
MCHC RBC AUTO-ENTMCNC: 32.8 G/DL (ref 32–36.5)
MCV RBC AUTO: 94.1 FL (ref 78–100)
MONOCYTES # BLD: 0.3 K/MCL (ref 0.3–0.9)
MONOCYTES NFR BLD: 6 %
NEUTROPHILS # BLD: 3 K/MCL (ref 1.8–7.7)
NEUTROPHILS NFR BLD: 62 %
NRBC BLD MANUAL-RTO: 0 /100 WBC
PLATELET # BLD AUTO: 221 K/MCL (ref 140–450)
POTASSIUM SERPL-SCNC: 3.7 MMOL/L (ref 3.4–5.1)
PROT SERPL-MCNC: 7.1 G/DL (ref 6.4–8.2)
RBC # BLD: 3.7 MIL/MCL (ref 4–5.2)
SODIUM SERPL-SCNC: 139 MMOL/L (ref 135–145)
TIBC SERPL-MCNC: 323 MCG/DL (ref 250–450)
WBC # BLD: 4.8 K/MCL (ref 4.2–11)

## 2024-01-31 PROCEDURE — 80053 COMPREHEN METABOLIC PANEL: CPT

## 2024-01-31 PROCEDURE — 82728 ASSAY OF FERRITIN: CPT

## 2024-01-31 PROCEDURE — 99211 OFF/OP EST MAY X REQ PHY/QHP: CPT

## 2024-01-31 PROCEDURE — 83550 IRON BINDING TEST: CPT

## 2024-01-31 PROCEDURE — 99215 OFFICE O/P EST HI 40 MIN: CPT | Performed by: INTERNAL MEDICINE

## 2024-01-31 PROCEDURE — 86663 EPSTEIN-BARR ANTIBODY: CPT

## 2024-01-31 PROCEDURE — 86665 EPSTEIN-BARR CAPSID VCA: CPT

## 2024-01-31 PROCEDURE — 85025 COMPLETE CBC W/AUTO DIFF WBC: CPT

## 2024-01-31 PROCEDURE — 81206 BCR/ABL1 GENE MAJOR BP: CPT

## 2024-01-31 PROCEDURE — 82746 ASSAY OF FOLIC ACID SERUM: CPT

## 2024-01-31 PROCEDURE — 36415 COLL VENOUS BLD VENIPUNCTURE: CPT

## 2024-01-31 ASSESSMENT — ENCOUNTER SYMPTOMS
FEVER: 1
BLOOD IN STOOL: 0
ABDOMINAL PAIN: 0
BACK PAIN: 0
FATIGUE: 1
DIARRHEA: 0
ABDOMINAL DISTENTION: 0
ACTIVITY CHANGE: 0
CONSTIPATION: 0
SLEEP DISTURBANCE: 0
HEADACHES: 1
ADENOPATHY: 0
DIZZINESS: 0
DIAPHORESIS: 0
WHEEZING: 0
VOICE CHANGE: 1
COUGH: 0
LIGHT-HEADEDNESS: 0
APPETITE CHANGE: 0
CONFUSION: 0
CHOKING: 0
NAUSEA: 0
UNEXPECTED WEIGHT CHANGE: 0
APNEA: 0
SORE THROAT: 1
BRUISES/BLEEDS EASILY: 0
CHEST TIGHTNESS: 0
CHILLS: 1
VOMITING: 0
TROUBLE SWALLOWING: 0
SPEECH DIFFICULTY: 0
SHORTNESS OF BREATH: 0
WEAKNESS: 1

## 2024-01-31 ASSESSMENT — PAIN SCALES - GENERAL: PAINLEVEL: 0

## 2024-01-31 ASSESSMENT — PATIENT HEALTH QUESTIONNAIRE - PHQ9
SUM OF ALL RESPONSES TO PHQ9 QUESTIONS 1 AND 2: 0
CLINICAL INTERPRETATION OF PHQ2 SCORE: NO FURTHER SCREENING NEEDED
SUM OF ALL RESPONSES TO PHQ9 QUESTIONS 1 AND 2: 0
2. FEELING DOWN, DEPRESSED OR HOPELESS: NOT AT ALL
1. LITTLE INTEREST OR PLEASURE IN DOING THINGS: NOT AT ALL

## 2024-02-01 LAB
FOLATE SERPL-MCNC: 20.6 NG/ML
VIT B12 SERPL-MCNC: 884 PG/ML (ref 211–911)

## 2024-02-02 LAB
SERVICE CMNT-IMP: ABNORMAL
T(9;22)(ABL1,BCR)/CONTROL BLD/T-LNRTO: >4.52 LOG
T(ABL1,BCR)P210 BLD/T QL: ABNORMAL
T(ABL1,BCR)P210/CONTROL (IS) BLD/T: <0.003 %

## 2024-02-06 LAB
EBV EA IGG SER-ACNC: NORMAL
EBV NA IGG SER-ACNC: NORMAL
EBV VCA IGG SER-ACNC: NORMAL
EBV VCA IGM SER-ACNC: NORMAL

## 2024-02-08 ENCOUNTER — NURSE TRIAGE (OUTPATIENT)
Dept: FAMILY MEDICINE CLINIC | Facility: CLINIC | Age: 44
End: 2024-02-08

## 2024-02-08 NOTE — TELEPHONE ENCOUNTER
Please reply to pool: EM RN TRIAGE  Action Requested: Summary for Provider     []  Critical Lab, Recommendations Needed  [x] Need Additional Advice  []   FYI    []   Need Orders  [] Need Medications Sent to Pharmacy  []  Other     SUMMARY: Patient contacts clinic reporting sore throat and mouth sores that began 1 week ago.  Feels chilled and achy.  There appears to be small, blister like lesions in her mouth.  Denies throat swelling or closing, no breathing difficulty.  She has CML and takes oral chemotherapy.  RN advised she contact her oncologist, she states she will.  Requests an appointment only with Dr. Mckeon, declines acute appointment today or tomorrow with another provider.  Requests Dr. Mckeon be notified to see if she can work her in.  RN advised that she has left the office today, she declines message being sent to another provider and will await Dr. Mckeon's advice.      Reason for call: Sore Throat  Onset: Data Unavailable                       Reason for Disposition   Receiving chemotherapy or radiation therapy    Protocols used: Mouth Symptoms-A-[place]

## 2024-02-09 NOTE — TELEPHONE ENCOUNTER
Spoke to patient, verified Name and . Relayed Dr. Mckeon's message below. Appointment scheduled. States that her throat symptoms seem to have improved, she no longer see the \"bubbles\" blister-like lesions but just redness. However, she remains to have chills, shivering, and headache.    Future Appointments   Date Time Provider Department Center   2/10/2024  8:00 AM [facility]

## 2024-02-10 ENCOUNTER — OFFICE VISIT (OUTPATIENT)
Dept: FAMILY MEDICINE CLINIC | Facility: CLINIC | Age: 44
End: 2024-02-10

## 2024-02-10 ENCOUNTER — HOSPITAL ENCOUNTER (OUTPATIENT)
Dept: GENERAL RADIOLOGY | Age: 44
Discharge: HOME | End: 2024-02-10
Attending: FAMILY MEDICINE
Payer: COMMERCIAL

## 2024-02-10 VITALS
BODY MASS INDEX: 23.14 KG/M2 | TEMPERATURE: 98 F | SYSTOLIC BLOOD PRESSURE: 101 MMHG | WEIGHT: 144 LBS | DIASTOLIC BLOOD PRESSURE: 67 MMHG | HEIGHT: 66 IN | HEART RATE: 79 BPM

## 2024-02-10 DIAGNOSIS — R05.9 COUGH, UNSPECIFIED TYPE: Primary | ICD-10-CM

## 2024-02-10 DIAGNOSIS — R50.9 FEVER, UNSPECIFIED FEVER CAUSE: ICD-10-CM

## 2024-02-10 DIAGNOSIS — R05.9 COUGH, UNSPECIFIED TYPE: ICD-10-CM

## 2024-02-10 DIAGNOSIS — B34.9 VIRAL SYNDROME: ICD-10-CM

## 2024-02-10 PROCEDURE — 71046 X-RAY EXAM CHEST 2 VIEWS: CPT | Performed by: FAMILY MEDICINE

## 2024-02-10 PROCEDURE — 3008F BODY MASS INDEX DOCD: CPT | Performed by: FAMILY MEDICINE

## 2024-02-10 PROCEDURE — 3074F SYST BP LT 130 MM HG: CPT | Performed by: FAMILY MEDICINE

## 2024-02-10 PROCEDURE — 99214 OFFICE O/P EST MOD 30 MIN: CPT | Performed by: FAMILY MEDICINE

## 2024-02-10 PROCEDURE — 3078F DIAST BP <80 MM HG: CPT | Performed by: FAMILY MEDICINE

## 2024-02-10 RX ORDER — DULOXETIN HYDROCHLORIDE 20 MG/1
20 CAPSULE, DELAYED RELEASE ORAL DAILY
Qty: 90 CAPSULE | Refills: 0 | Status: SHIPPED | OUTPATIENT
Start: 2024-02-10

## 2024-02-10 NOTE — PROGRESS NOTES
Subjective:   Patient ID: Liseth Bang is a 43 year old female.    Patient with history of CML, Mediteranean fever who is complaining about 1 month of chilles and fevers and also had what appears like aphthous eruptions in the mouth. Was seen by hematologist/oncologist blood tests were done and appear good   She feels tired all the time and also has back pain most of the time.         History/Other:   Review of Systems    Constitutional: see hpi   Heent see hpi     Respiratory: Negative.  Negative for apnea, cough, chest tightness and shortness of breath.    Cardiovascular: Negative.  Negative for chest pain, palpitations and leg swelling.   Gastrointestinal: Negative.  Negative for abdominal pain.   Skin: Negative.       Current Outpatient Medications   Medication Sig Dispense Refill    DULoxetine 20 MG Oral Cap DR Particles Take 1 capsule (20 mg total) by mouth daily. 90 capsule 0    Imatinib Mesylate 400 MG Oral Tab Take 1 tablet (400 mg total) by mouth daily.       Allergies:  Allergies   Allergen Reactions    Pcn [Penicillins] ANAPHYLAXIS       Objective:   Physical Exam  Constitutional:       Appearance: She is well-developed.   Cardiovascular:      Rate and Rhythm: Normal rate and regular rhythm.      Heart sounds: Normal heart sounds.   Pulmonary:      Effort: Pulmonary effort is normal.      Breath sounds: Normal breath sounds.   Abdominal:      General: Bowel sounds are normal.      Palpations: Abdomen is soft.   Neurological:      Mental Status: She is alert.      Deep Tendon Reflexes: Reflexes are normal and symmetric.         Assessment & Plan:   1. Cough, unspecified type        ICD-10-CM    1. Cough, unspecified type  R05.9 XR CHEST PA + LAT CHEST (CPT=71046)      2. Fever, unspecified fever cause  R50.9       3. Viral syndrome  B34.9       Not sure of etiology of her symptoms  Recommended to do x ray   If continues will see again her hematologist/oncologist   Also recommended to restart  duloxetine      No orders of the defined types were placed in this encounter.      Meds This Visit:  Requested Prescriptions     Signed Prescriptions Disp Refills    DULoxetine 20 MG Oral Cap DR Particles 90 capsule 0     Sig: Take 1 capsule (20 mg total) by mouth daily.       Imaging & Referrals:  XR CHEST PA + LAT CHEST (CPT=71046)

## 2024-04-01 DIAGNOSIS — C92.11 CML IN REMISSION (CMD): Primary | ICD-10-CM

## 2024-04-01 DIAGNOSIS — D64.9 ANEMIA, UNSPECIFIED TYPE: ICD-10-CM

## 2024-04-29 ENCOUNTER — APPOINTMENT (OUTPATIENT)
Dept: HEMATOLOGY/ONCOLOGY | Age: 44
End: 2024-04-29
Attending: INTERNAL MEDICINE

## 2024-04-29 ENCOUNTER — APPOINTMENT (OUTPATIENT)
Dept: LAB | Age: 44
End: 2024-04-29
Attending: INTERNAL MEDICINE

## 2024-05-03 ENCOUNTER — TELEPHONE (OUTPATIENT)
Dept: HEMATOLOGY/ONCOLOGY | Age: 44
End: 2024-05-03

## 2024-05-14 ENCOUNTER — TELEPHONE (OUTPATIENT)
Dept: HEMATOLOGY/ONCOLOGY | Age: 44
End: 2024-05-14

## 2024-05-14 DIAGNOSIS — C92.11 CML IN REMISSION  (CMD): ICD-10-CM

## 2024-05-14 RX ORDER — IMATINIB MESYLATE 400 MG/1
400 TABLET, FILM COATED ORAL DAILY
Qty: 90 TABLET | Refills: 1 | Status: SHIPPED | OUTPATIENT
Start: 2024-05-14

## 2024-06-05 ENCOUNTER — OFFICE VISIT (OUTPATIENT)
Dept: HEMATOLOGY/ONCOLOGY | Age: 44
End: 2024-06-05
Attending: INTERNAL MEDICINE

## 2024-06-05 ENCOUNTER — LAB SERVICES (OUTPATIENT)
Dept: LAB | Age: 44
End: 2024-06-05
Attending: INTERNAL MEDICINE

## 2024-06-05 VITALS
WEIGHT: 146 LBS | SYSTOLIC BLOOD PRESSURE: 107 MMHG | RESPIRATION RATE: 16 BRPM | OXYGEN SATURATION: 99 % | BODY MASS INDEX: 23.46 KG/M2 | TEMPERATURE: 98 F | HEART RATE: 74 BPM | HEIGHT: 66 IN | DIASTOLIC BLOOD PRESSURE: 67 MMHG

## 2024-06-05 DIAGNOSIS — D64.9 ANEMIA, UNSPECIFIED TYPE: ICD-10-CM

## 2024-06-05 DIAGNOSIS — D64.9 ANEMIA, UNSPECIFIED TYPE: Primary | ICD-10-CM

## 2024-06-05 DIAGNOSIS — C92.11 CML IN REMISSION  (CMD): ICD-10-CM

## 2024-06-05 LAB
ALBUMIN SERPL-MCNC: 3.9 G/DL (ref 3.6–5.1)
ALBUMIN/GLOB SERPL: 1.3 {RATIO} (ref 1–2.4)
ALP SERPL-CCNC: 59 UNITS/L (ref 45–117)
ALT SERPL-CCNC: 32 UNITS/L
ANION GAP SERPL CALC-SCNC: 5 MMOL/L (ref 7–19)
AST SERPL-CCNC: 27 UNITS/L
BASOPHILS # BLD: 0 K/MCL (ref 0–0.3)
BASOPHILS NFR BLD: 0 %
BILIRUB SERPL-MCNC: 0.4 MG/DL (ref 0.2–1)
BUN SERPL-MCNC: 11 MG/DL (ref 6–20)
BUN/CREAT SERPL: 18 (ref 7–25)
CALCIUM SERPL-MCNC: 8.7 MG/DL (ref 8.4–10.2)
CHLORIDE SERPL-SCNC: 110 MMOL/L (ref 97–110)
CO2 SERPL-SCNC: 28 MMOL/L (ref 21–32)
CREAT SERPL-MCNC: 0.62 MG/DL (ref 0.51–0.95)
DEPRECATED RDW RBC: 48.9 FL (ref 39–50)
EGFRCR SERPLBLD CKD-EPI 2021: >90 ML/MIN/{1.73_M2}
EOSINOPHIL # BLD: 0.1 K/MCL (ref 0–0.5)
EOSINOPHIL NFR BLD: 2 %
ERYTHROCYTE [DISTWIDTH] IN BLOOD: 13.5 % (ref 11–15)
FASTING DURATION TIME PATIENT: ABNORMAL H
FERRITIN SERPL-MCNC: 34 NG/ML (ref 8–252)
FOLATE SERPL-MCNC: 11.2 NG/ML
GLOBULIN SER-MCNC: 3 G/DL (ref 2–4)
GLUCOSE SERPL-MCNC: 118 MG/DL (ref 70–99)
HCT VFR BLD CALC: 35.5 % (ref 36–46.5)
HGB BLD-MCNC: 11.3 G/DL (ref 12–15.5)
IMM GRANULOCYTES # BLD AUTO: 0 K/MCL (ref 0–0.2)
IMM GRANULOCYTES # BLD: 0 %
IRON SATN MFR SERPL: 22 % (ref 15–45)
IRON SERPL-MCNC: 74 MCG/DL (ref 50–170)
LYMPHOCYTES # BLD: 1 K/MCL (ref 1–4.8)
LYMPHOCYTES NFR BLD: 23 %
MCH RBC QN AUTO: 31 PG (ref 26–34)
MCHC RBC AUTO-ENTMCNC: 31.8 G/DL (ref 32–36.5)
MCV RBC AUTO: 97.5 FL (ref 78–100)
MONOCYTES # BLD: 0.3 K/MCL (ref 0.3–0.9)
MONOCYTES NFR BLD: 7 %
NEUTROPHILS # BLD: 3.1 K/MCL (ref 1.8–7.7)
NEUTROPHILS NFR BLD: 68 %
NRBC BLD MANUAL-RTO: 0 /100 WBC
PLATELET # BLD AUTO: 231 K/MCL (ref 140–450)
POTASSIUM SERPL-SCNC: 4.1 MMOL/L (ref 3.4–5.1)
PROT SERPL-MCNC: 6.9 G/DL (ref 6.4–8.2)
RBC # BLD: 3.64 MIL/MCL (ref 4–5.2)
SODIUM SERPL-SCNC: 139 MMOL/L (ref 135–145)
TIBC SERPL-MCNC: 329 MCG/DL (ref 250–450)
VIT B12 SERPL-MCNC: 487 PG/ML (ref 211–911)
WBC # BLD: 4.5 K/MCL (ref 4.2–11)

## 2024-06-05 PROCEDURE — 36415 COLL VENOUS BLD VENIPUNCTURE: CPT

## 2024-06-05 PROCEDURE — 80053 COMPREHEN METABOLIC PANEL: CPT

## 2024-06-05 PROCEDURE — G2211 COMPLEX E/M VISIT ADD ON: HCPCS | Performed by: INTERNAL MEDICINE

## 2024-06-05 PROCEDURE — 81206 BCR/ABL1 GENE MAJOR BP: CPT

## 2024-06-05 PROCEDURE — 83540 ASSAY OF IRON: CPT

## 2024-06-05 PROCEDURE — 82728 ASSAY OF FERRITIN: CPT

## 2024-06-05 PROCEDURE — 85025 COMPLETE CBC W/AUTO DIFF WBC: CPT

## 2024-06-05 PROCEDURE — 82607 VITAMIN B-12: CPT

## 2024-06-05 PROCEDURE — 99215 OFFICE O/P EST HI 40 MIN: CPT | Performed by: INTERNAL MEDICINE

## 2024-06-05 PROCEDURE — 99211 OFF/OP EST MAY X REQ PHY/QHP: CPT

## 2024-06-05 ASSESSMENT — PATIENT HEALTH QUESTIONNAIRE - PHQ9
SUM OF ALL RESPONSES TO PHQ9 QUESTIONS 1 AND 2: 0
CLINICAL INTERPRETATION OF PHQ2 SCORE: NO FURTHER SCREENING NEEDED
1. LITTLE INTEREST OR PLEASURE IN DOING THINGS: NOT AT ALL
SUM OF ALL RESPONSES TO PHQ9 QUESTIONS 1 AND 2: 0
2. FEELING DOWN, DEPRESSED OR HOPELESS: NOT AT ALL

## 2024-06-05 ASSESSMENT — ENCOUNTER SYMPTOMS
CHILLS: 1
WEAKNESS: 1
NAUSEA: 0
DIAPHORESIS: 0
CONFUSION: 0
VOICE CHANGE: 1
SHORTNESS OF BREATH: 0
UNEXPECTED WEIGHT CHANGE: 0
TROUBLE SWALLOWING: 0
SORE THROAT: 1
ABDOMINAL DISTENTION: 0
BACK PAIN: 0
BRUISES/BLEEDS EASILY: 0
FEVER: 1
CHEST TIGHTNESS: 0
CHOKING: 0
APNEA: 0
ABDOMINAL PAIN: 0
HEADACHES: 1
APPETITE CHANGE: 0
ACTIVITY CHANGE: 0
LIGHT-HEADEDNESS: 0
BLOOD IN STOOL: 0
FATIGUE: 1
DIZZINESS: 0
WHEEZING: 0
SPEECH DIFFICULTY: 0
DIARRHEA: 0
ADENOPATHY: 0
SLEEP DISTURBANCE: 0
VOMITING: 0
CONSTIPATION: 0
COUGH: 0

## 2024-06-05 ASSESSMENT — PAIN SCALES - GENERAL: PAINLEVEL: 0

## 2024-06-07 LAB
SERVICE CMNT-IMP: ABNORMAL
T(9;22)(ABL1,BCR)/CONTROL BLD/T-LNRTO: 4.42 LOG
T(ABL1,BCR)P210 BLD/T QL: POSITIVE
T(ABL1,BCR)P210/CONTROL (IS) BLD/T: 0 %

## 2024-08-11 DIAGNOSIS — C92.11 CML IN REMISSION  (CMD): ICD-10-CM

## 2024-08-13 RX ORDER — IMATINIB MESYLATE 400 MG/1
400 TABLET, FILM COATED ORAL DAILY
Qty: 90 TABLET | Refills: 1 | Status: SHIPPED | OUTPATIENT
Start: 2024-08-13

## 2024-08-14 ENCOUNTER — TELEPHONE (OUTPATIENT)
Dept: FAMILY MEDICINE CLINIC | Facility: CLINIC | Age: 44
End: 2024-08-14

## 2024-09-03 DIAGNOSIS — D53.9 NUTRITIONAL ANEMIA, UNSPECIFIED: Primary | ICD-10-CM

## 2024-09-03 DIAGNOSIS — C92.11 CML IN REMISSION  (CMD): ICD-10-CM

## 2024-09-04 ENCOUNTER — LAB SERVICES (OUTPATIENT)
Dept: LAB | Age: 44
End: 2024-09-04
Attending: INTERNAL MEDICINE

## 2024-09-04 ENCOUNTER — OFFICE VISIT (OUTPATIENT)
Dept: HEMATOLOGY/ONCOLOGY | Age: 44
End: 2024-09-04
Attending: INTERNAL MEDICINE

## 2024-09-04 ENCOUNTER — TELEPHONE (OUTPATIENT)
Dept: HEMATOLOGY/ONCOLOGY | Age: 44
End: 2024-09-04

## 2024-09-04 VITALS
SYSTOLIC BLOOD PRESSURE: 103 MMHG | WEIGHT: 147 LBS | HEIGHT: 66 IN | DIASTOLIC BLOOD PRESSURE: 61 MMHG | OXYGEN SATURATION: 99 % | BODY MASS INDEX: 23.63 KG/M2 | RESPIRATION RATE: 16 BRPM | TEMPERATURE: 98.1 F | HEART RATE: 72 BPM

## 2024-09-04 DIAGNOSIS — C92.11 CML IN REMISSION  (CMD): ICD-10-CM

## 2024-09-04 DIAGNOSIS — D53.9 NUTRITIONAL ANEMIA, UNSPECIFIED: ICD-10-CM

## 2024-09-04 DIAGNOSIS — M54.40 ACUTE BILATERAL LOW BACK PAIN WITH SCIATICA, SCIATICA LATERALITY UNSPECIFIED: Primary | ICD-10-CM

## 2024-09-04 LAB
ALBUMIN SERPL-MCNC: 3.9 G/DL (ref 3.6–5.1)
ALBUMIN/GLOB SERPL: 1.3 {RATIO} (ref 1–2.4)
ALP SERPL-CCNC: 48 UNITS/L (ref 45–117)
ALT SERPL-CCNC: 26 UNITS/L
ANION GAP SERPL CALC-SCNC: 8 MMOL/L (ref 7–19)
AST SERPL-CCNC: 28 UNITS/L
BASOPHILS # BLD: 0 K/MCL (ref 0–0.3)
BASOPHILS NFR BLD: 1 %
BILIRUB SERPL-MCNC: 0.5 MG/DL (ref 0.2–1)
BUN SERPL-MCNC: 14 MG/DL (ref 6–20)
BUN/CREAT SERPL: 22 (ref 7–25)
CALCIUM SERPL-MCNC: 8.8 MG/DL (ref 8.4–10.2)
CHLORIDE SERPL-SCNC: 109 MMOL/L (ref 97–110)
CO2 SERPL-SCNC: 25 MMOL/L (ref 21–32)
CREAT SERPL-MCNC: 0.64 MG/DL (ref 0.51–0.95)
DEPRECATED RDW RBC: 45.3 FL (ref 39–50)
EGFRCR SERPLBLD CKD-EPI 2021: >90 ML/MIN/{1.73_M2}
EOSINOPHIL # BLD: 0.1 K/MCL (ref 0–0.5)
EOSINOPHIL NFR BLD: 2 %
ERYTHROCYTE [DISTWIDTH] IN BLOOD: 13.3 % (ref 11–15)
FASTING DURATION TIME PATIENT: ABNORMAL H
FERRITIN SERPL-MCNC: 32 NG/ML (ref 8–252)
FOLATE SERPL-MCNC: 13.8 NG/ML
GLOBULIN SER-MCNC: 3 G/DL (ref 2–4)
GLUCOSE SERPL-MCNC: 100 MG/DL (ref 70–99)
HCT VFR BLD CALC: 34.3 % (ref 36–46.5)
HGB BLD-MCNC: 11.2 G/DL (ref 12–15.5)
IMM GRANULOCYTES # BLD AUTO: 0 K/MCL (ref 0–0.2)
IMM GRANULOCYTES # BLD: 0 %
IRON SATN MFR SERPL: 27 % (ref 15–45)
IRON SERPL-MCNC: 83 MCG/DL (ref 50–170)
LYMPHOCYTES # BLD: 1.1 K/MCL (ref 1–4.8)
LYMPHOCYTES NFR BLD: 23 %
MCH RBC QN AUTO: 30.4 PG (ref 26–34)
MCHC RBC AUTO-ENTMCNC: 32.7 G/DL (ref 32–36.5)
MCV RBC AUTO: 93.2 FL (ref 78–100)
MONOCYTES # BLD: 0.4 K/MCL (ref 0.3–0.9)
MONOCYTES NFR BLD: 9 %
NEUTROPHILS # BLD: 3 K/MCL (ref 1.8–7.7)
NEUTROPHILS NFR BLD: 65 %
NRBC BLD MANUAL-RTO: 0 /100 WBC
PLATELET # BLD AUTO: 213 K/MCL (ref 140–450)
POTASSIUM SERPL-SCNC: 4.1 MMOL/L (ref 3.4–5.1)
PROT SERPL-MCNC: 6.9 G/DL (ref 6.4–8.2)
RBC # BLD: 3.68 MIL/MCL (ref 4–5.2)
SODIUM SERPL-SCNC: 138 MMOL/L (ref 135–145)
TIBC SERPL-MCNC: 313 MCG/DL (ref 250–450)
VIT B12 SERPL-MCNC: 563 PG/ML (ref 211–911)
WBC # BLD: 4.6 K/MCL (ref 4.2–11)

## 2024-09-04 PROCEDURE — 80053 COMPREHEN METABOLIC PANEL: CPT

## 2024-09-04 PROCEDURE — 85025 COMPLETE CBC W/AUTO DIFF WBC: CPT

## 2024-09-04 PROCEDURE — 99215 OFFICE O/P EST HI 40 MIN: CPT | Performed by: INTERNAL MEDICINE

## 2024-09-04 PROCEDURE — 36415 COLL VENOUS BLD VENIPUNCTURE: CPT

## 2024-09-04 PROCEDURE — 82728 ASSAY OF FERRITIN: CPT

## 2024-09-04 PROCEDURE — 83540 ASSAY OF IRON: CPT

## 2024-09-04 PROCEDURE — 99211 OFF/OP EST MAY X REQ PHY/QHP: CPT

## 2024-09-04 PROCEDURE — 81206 BCR/ABL1 GENE MAJOR BP: CPT

## 2024-09-04 PROCEDURE — 82746 ASSAY OF FOLIC ACID SERUM: CPT

## 2024-09-04 ASSESSMENT — ENCOUNTER SYMPTOMS
BRUISES/BLEEDS EASILY: 0
VOICE CHANGE: 1
ACTIVITY CHANGE: 0
SPEECH DIFFICULTY: 0
COUGH: 0
VOMITING: 0
BACK PAIN: 0
DIARRHEA: 0
ABDOMINAL PAIN: 0
CHILLS: 1
HEADACHES: 1
NAUSEA: 0
UNEXPECTED WEIGHT CHANGE: 0
CONFUSION: 0
SHORTNESS OF BREATH: 0
SORE THROAT: 1
FEVER: 1
ADENOPATHY: 0
SLEEP DISTURBANCE: 0
LIGHT-HEADEDNESS: 0
CHOKING: 0
APNEA: 0
WEAKNESS: 1
ABDOMINAL DISTENTION: 0
APPETITE CHANGE: 0
WHEEZING: 0
CONSTIPATION: 0
TROUBLE SWALLOWING: 0
BLOOD IN STOOL: 0
CHEST TIGHTNESS: 0
DIAPHORESIS: 0
FATIGUE: 1
DIZZINESS: 0

## 2024-09-04 ASSESSMENT — PATIENT HEALTH QUESTIONNAIRE - PHQ9
1. LITTLE INTEREST OR PLEASURE IN DOING THINGS: NOT AT ALL
2. FEELING DOWN, DEPRESSED OR HOPELESS: NOT AT ALL
2. FEELING DOWN, DEPRESSED OR HOPELESS: NOT AT ALL
SUM OF ALL RESPONSES TO PHQ9 QUESTIONS 1 AND 2: 0
CLINICAL INTERPRETATION OF PHQ2 SCORE: NO FURTHER SCREENING NEEDED
1. LITTLE INTEREST OR PLEASURE IN DOING THINGS: NOT AT ALL
CLINICAL INTERPRETATION OF PHQ2 SCORE: NO FURTHER SCREENING NEEDED
SUM OF ALL RESPONSES TO PHQ9 QUESTIONS 1 AND 2: 0

## 2024-09-04 ASSESSMENT — PAIN SCALES - GENERAL: PAINLEVEL: 4

## 2024-09-05 ENCOUNTER — TELEPHONE (OUTPATIENT)
Dept: HEMATOLOGY/ONCOLOGY | Age: 44
End: 2024-09-05

## 2024-09-05 ENCOUNTER — HOSPITAL ENCOUNTER (OUTPATIENT)
Dept: MRI IMAGING | Age: 44
Discharge: HOME | End: 2024-09-05
Attending: INTERNAL MEDICINE

## 2024-09-05 DIAGNOSIS — M54.9 ACUTE BACK PAIN, UNSPECIFIED BACK LOCATION, UNSPECIFIED BACK PAIN LATERALITY: Primary | ICD-10-CM

## 2024-09-05 DIAGNOSIS — M54.9 ACUTE BACK PAIN, UNSPECIFIED BACK LOCATION, UNSPECIFIED BACK PAIN LATERALITY: ICD-10-CM

## 2024-09-05 PROCEDURE — 10002805 HB CONTRAST AGENT: Performed by: INTERNAL MEDICINE

## 2024-09-05 PROCEDURE — A9585 GADOBUTROL INJECTION: HCPCS | Performed by: INTERNAL MEDICINE

## 2024-09-05 PROCEDURE — 72158 MRI LUMBAR SPINE W/O & W/DYE: CPT

## 2024-09-05 RX ORDER — GADOBUTROL 604.72 MG/ML
7.5 INJECTION INTRAVENOUS ONCE
Status: COMPLETED | OUTPATIENT
Start: 2024-09-05 | End: 2024-09-05

## 2024-09-05 RX ADMIN — GADOBUTROL 7.5 ML: 604.72 INJECTION INTRAVENOUS at 19:01

## 2024-09-06 ENCOUNTER — E-ADVICE (OUTPATIENT)
Dept: HEMATOLOGY/ONCOLOGY | Age: 44
End: 2024-09-06

## 2024-09-10 ENCOUNTER — E-ADVICE (OUTPATIENT)
Dept: HEMATOLOGY/ONCOLOGY | Age: 44
End: 2024-09-10

## 2024-09-24 ENCOUNTER — E-ADVICE (OUTPATIENT)
Dept: HEMATOLOGY/ONCOLOGY | Age: 44
End: 2024-09-24

## 2024-10-09 ENCOUNTER — TELEPHONE (OUTPATIENT)
Dept: INFUSION THERAPY | Age: 44
End: 2024-10-09

## 2024-10-23 ENCOUNTER — OFFICE VISIT (OUTPATIENT)
Dept: FAMILY MEDICINE CLINIC | Facility: CLINIC | Age: 44
End: 2024-10-23

## 2024-10-23 VITALS
OXYGEN SATURATION: 99 % | SYSTOLIC BLOOD PRESSURE: 96 MMHG | DIASTOLIC BLOOD PRESSURE: 56 MMHG | HEART RATE: 75 BPM | WEIGHT: 148 LBS | BODY MASS INDEX: 23.78 KG/M2 | HEIGHT: 66 IN

## 2024-10-23 DIAGNOSIS — N30.01 ACUTE CYSTITIS WITH HEMATURIA: Primary | ICD-10-CM

## 2024-10-23 DIAGNOSIS — R30.0 DYSURIA: ICD-10-CM

## 2024-10-23 LAB
APPEARANCE: CLEAR
BILIRUBIN: NEGATIVE
GLUCOSE (URINE DIPSTICK): NEGATIVE MG/DL
KETONES (URINE DIPSTICK): NEGATIVE MG/DL
MULTISTIX LOT#: ABNORMAL NUMERIC
NITRITE, URINE: NEGATIVE
PH, URINE: 7.5 (ref 4.5–8)
SPECIFIC GRAVITY: 1.01 (ref 1–1.03)
URINE-COLOR: YELLOW
UROBILINOGEN,SEMI-QN: 0.2 MG/DL (ref 0–1.9)

## 2024-10-23 PROCEDURE — 3008F BODY MASS INDEX DOCD: CPT

## 2024-10-23 PROCEDURE — 3078F DIAST BP <80 MM HG: CPT

## 2024-10-23 PROCEDURE — 99213 OFFICE O/P EST LOW 20 MIN: CPT

## 2024-10-23 PROCEDURE — 81002 URINALYSIS NONAUTO W/O SCOPE: CPT

## 2024-10-23 PROCEDURE — 3074F SYST BP LT 130 MM HG: CPT

## 2024-10-23 RX ORDER — NITROFURANTOIN 25; 75 MG/1; MG/1
100 CAPSULE ORAL 2 TIMES DAILY
Qty: 14 CAPSULE | Refills: 0 | Status: SHIPPED | OUTPATIENT
Start: 2024-10-23

## 2024-10-23 NOTE — PROGRESS NOTES
Subjective:   Liseth Bang is a 44 year old female who presents for Low Back Pain (Pt has been having lower back pain and painful urination and frequent urination for 4 days).     HPI   Patient complains of abnormal smelling urine, burning with urination, dysuria, frequency, hesitancy, incomplete bladder emptying, suprapubic pressure/pain and urgency.   She has had symptoms for 4 days.   She also complains of back pain and headache.   She denies congestion, cough, fever, rhinitis, stomach ache, and vaginal discharge.   She does have a history of recurrent UTI. She does not have a history of pyelonephritis.     History/Other:      Chief Complaint Reviewed and Verified  Nursing Notes Reviewed and   Verified  Tobacco Reviewed  Allergies Reviewed  Medications Reviewed    Problem List Reviewed  Medical History Reviewed  Surgical History   Reviewed  OB Status Reviewed  Family History Reviewed  Social History   Reviewed           Tobacco:  She has never smoked tobacco.      Current Outpatient Medications   Medication Sig Dispense Refill    nitrofurantoin monohydrate macro (MACROBID) 100 MG Oral Cap Take 1 capsule (100 mg total) by mouth 2 (two) times daily. 14 capsule 0    Imatinib Mesylate 400 MG Oral Tab Take 1 tablet (400 mg total) by mouth daily.      DULoxetine 20 MG Oral Cap DR Particles Take 1 capsule (20 mg total) by mouth daily. (Patient not taking: Reported on 10/23/2024) 90 capsule 0       Allergies[1]      Review of Systems   Constitutional: Negative.  Negative for activity change and fatigue.   HENT: Negative.  Negative for congestion, ear pain, rhinorrhea and sneezing.    Eyes: Negative.  Negative for redness.   Respiratory: Negative.  Negative for cough, shortness of breath and wheezing.    Cardiovascular: Negative.  Negative for chest pain.   Gastrointestinal: Negative.  Negative for abdominal pain, constipation, diarrhea, nausea and vomiting.   Endocrine: Negative.    Genitourinary:  Positive  for difficulty urinating, dysuria, frequency, pelvic pain and urgency.   Musculoskeletal: Negative.  Negative for back pain, joint swelling and myalgias.   Skin: Negative.  Negative for rash.   Allergic/Immunologic: Negative.    Neurological: Negative.  Negative for dizziness, syncope, light-headedness and headaches.   Hematological: Negative.    Psychiatric/Behavioral: Negative.           Objective:   BP 96/56 (BP Location: Right arm, Patient Position: Sitting, Cuff Size: adult)   Pulse 75   Ht 5' 6\" (1.676 m)   Wt 148 lb (67.1 kg)   LMP 10/09/2024 (Approximate)   SpO2 99%   BMI 23.89 kg/m²  Estimated body mass index is 23.89 kg/m² as calculated from the following:    Height as of this encounter: 5' 6\" (1.676 m).    Weight as of this encounter: 148 lb (67.1 kg).      Physical Exam  Vitals and nursing note reviewed.   Constitutional:       Appearance: Normal appearance. She is normal weight.   HENT:      Head: Normocephalic and atraumatic.      Right Ear: Tympanic membrane normal.      Left Ear: Tympanic membrane normal.      Nose: Nose normal.      Mouth/Throat:      Mouth: Mucous membranes are moist.      Pharynx: Oropharynx is clear.   Eyes:      Extraocular Movements: Extraocular movements intact.      Conjunctiva/sclera: Conjunctivae normal.      Pupils: Pupils are equal, round, and reactive to light.   Cardiovascular:      Rate and Rhythm: Normal rate and regular rhythm.      Pulses: Normal pulses.      Heart sounds: Normal heart sounds.   Pulmonary:      Effort: Pulmonary effort is normal.      Breath sounds: Normal breath sounds.   Abdominal:      General: Abdomen is flat. Bowel sounds are normal.      Palpations: Abdomen is soft.      Tenderness: There is abdominal tenderness in the suprapubic area.   Musculoskeletal:         General: Normal range of motion.      Cervical back: Normal range of motion and neck supple.   Skin:     General: Skin is warm and dry.   Neurological:      General: No focal  deficit present.      Mental Status: She is alert and oriented to person, place, and time. Mental status is at baseline.   Psychiatric:         Mood and Affect: Mood normal.         Behavior: Behavior normal.         Thought Content: Thought content normal.         Judgment: Judgment normal.           Assessment & Plan:     1. Acute cystitis with hematuria  - nitrofurantoin monohydrate macro (MACROBID) 100 MG Oral Cap; Take 1 capsule (100 mg total) by mouth 2 (two) times daily.  Dispense: 14 capsule; Refill: 0  - Urine Culture, Routine [E]; Future  - Urine Culture, Routine [E]  -Office urinalysis is suggestive of UTI  -Will start on antibiotics as directed.  -Encouraged patient on increased fluids and discussed about incorporating cranberry juice.  -Patient educated about women's probiotics.  -Educated about voiding before and after sexual activity and using pH balanced soap.  -Urine culture was sent and will follow up after results received.     2. Dysuria  - URINALYSIS NONAUTO W/O SCOPE         Medication use, effects and side effects discussed in detail with patient. The patient indicated understanding of the diagnosis and agreed with the plan of care.    Return if symptoms worsen or fail to improve.    YOSVANY Ramos         [1]   Allergies  Allergen Reactions    Pcn [Penicillins] ANAPHYLAXIS

## 2024-12-03 DIAGNOSIS — D64.9 ANEMIA, UNSPECIFIED TYPE: Primary | ICD-10-CM

## 2024-12-03 DIAGNOSIS — C92.11 CML IN REMISSION  (CMD): ICD-10-CM

## 2024-12-04 ENCOUNTER — LAB SERVICES (OUTPATIENT)
Dept: LAB | Age: 44
End: 2024-12-04
Attending: INTERNAL MEDICINE

## 2024-12-04 ENCOUNTER — OFFICE VISIT (OUTPATIENT)
Dept: HEMATOLOGY/ONCOLOGY | Age: 44
End: 2024-12-04
Attending: INTERNAL MEDICINE

## 2024-12-04 VITALS
SYSTOLIC BLOOD PRESSURE: 115 MMHG | BODY MASS INDEX: 24.59 KG/M2 | RESPIRATION RATE: 16 BRPM | TEMPERATURE: 98 F | HEART RATE: 76 BPM | HEIGHT: 66 IN | DIASTOLIC BLOOD PRESSURE: 60 MMHG | WEIGHT: 153 LBS | OXYGEN SATURATION: 100 %

## 2024-12-04 DIAGNOSIS — N91.2 AMENORRHEA: ICD-10-CM

## 2024-12-04 DIAGNOSIS — R10.9 ABDOMINAL CRAMPING: ICD-10-CM

## 2024-12-04 DIAGNOSIS — D64.9 ANEMIA, UNSPECIFIED TYPE: Primary | ICD-10-CM

## 2024-12-04 DIAGNOSIS — C92.11 CML IN REMISSION  (CMD): ICD-10-CM

## 2024-12-04 DIAGNOSIS — D64.9 ANEMIA, UNSPECIFIED TYPE: ICD-10-CM

## 2024-12-04 LAB
ALBUMIN SERPL-MCNC: 3.8 G/DL (ref 3.4–5)
ALBUMIN/GLOB SERPL: 1.3 {RATIO} (ref 1–2.4)
ALP SERPL-CCNC: 52 UNITS/L (ref 45–117)
ALT SERPL-CCNC: 26 UNITS/L
ANION GAP SERPL CALC-SCNC: 8 MMOL/L (ref 7–19)
AST SERPL-CCNC: 26 UNITS/L
BASOPHILS # BLD: 0 K/MCL (ref 0–0.3)
BASOPHILS NFR BLD: 1 %
BILIRUB SERPL-MCNC: 0.3 MG/DL (ref 0.2–1)
BUN SERPL-MCNC: 12 MG/DL (ref 6–20)
BUN/CREAT SERPL: 21 (ref 7–25)
CALCIUM SERPL-MCNC: 8.7 MG/DL (ref 8.4–10.2)
CHLORIDE SERPL-SCNC: 109 MMOL/L (ref 97–110)
CO2 SERPL-SCNC: 25 MMOL/L (ref 21–32)
CREAT SERPL-MCNC: 0.58 MG/DL (ref 0.51–0.95)
DEPRECATED RDW RBC: 45.9 FL (ref 39–50)
EGFRCR SERPLBLD CKD-EPI 2021: >90 ML/MIN/{1.73_M2}
EOSINOPHIL # BLD: 0.1 K/MCL (ref 0–0.5)
EOSINOPHIL NFR BLD: 1 %
ERYTHROCYTE [DISTWIDTH] IN BLOOD: 13.4 % (ref 11–15)
FASTING DURATION TIME PATIENT: NORMAL H
FERRITIN SERPL-MCNC: 21 NG/ML (ref 8–252)
FOLATE SERPL-MCNC: 10.2 NG/ML
GLOBULIN SER-MCNC: 2.9 G/DL (ref 2–4)
GLUCOSE SERPL-MCNC: 96 MG/DL (ref 70–99)
HCG SERPL-ACNC: <3 MUNITS/ML
HCT VFR BLD CALC: 35.4 % (ref 36–46.5)
HGB BLD-MCNC: 11.4 G/DL (ref 12–15.5)
IMM GRANULOCYTES # BLD AUTO: 0 K/MCL (ref 0–0.2)
IMM GRANULOCYTES # BLD: 0 %
IRON SATN MFR SERPL: 34 % (ref 15–45)
IRON SERPL-MCNC: 111 MCG/DL (ref 50–170)
LYMPHOCYTES # BLD: 1.1 K/MCL (ref 1–4.8)
LYMPHOCYTES NFR BLD: 23 %
MCH RBC QN AUTO: 30.1 PG (ref 26–34)
MCHC RBC AUTO-ENTMCNC: 32.2 G/DL (ref 32–36.5)
MCV RBC AUTO: 93.4 FL (ref 78–100)
MONOCYTES # BLD: 0.3 K/MCL (ref 0.3–0.9)
MONOCYTES NFR BLD: 7 %
NEUTROPHILS # BLD: 3.2 K/MCL (ref 1.8–7.7)
NEUTROPHILS NFR BLD: 68 %
NRBC BLD MANUAL-RTO: 0 /100 WBC
PLATELET # BLD AUTO: 234 K/MCL (ref 140–450)
POTASSIUM SERPL-SCNC: 3.7 MMOL/L (ref 3.4–5.1)
PROT SERPL-MCNC: 6.7 G/DL (ref 6.4–8.2)
RAINBOW EXTRA TUBES HOLD SPECIMEN: NORMAL
RBC # BLD: 3.79 MIL/MCL (ref 4–5.2)
SODIUM SERPL-SCNC: 138 MMOL/L (ref 135–145)
TIBC SERPL-MCNC: 327 MCG/DL (ref 250–450)
VIT B12 SERPL-MCNC: 848 PG/ML (ref 211–911)
WBC # BLD: 4.7 K/MCL (ref 4.2–11)

## 2024-12-04 PROCEDURE — 84702 CHORIONIC GONADOTROPIN TEST: CPT

## 2024-12-04 PROCEDURE — 99215 OFFICE O/P EST HI 40 MIN: CPT | Performed by: INTERNAL MEDICINE

## 2024-12-04 PROCEDURE — 80053 COMPREHEN METABOLIC PANEL: CPT

## 2024-12-04 PROCEDURE — 85025 COMPLETE CBC W/AUTO DIFF WBC: CPT

## 2024-12-04 PROCEDURE — 36415 COLL VENOUS BLD VENIPUNCTURE: CPT

## 2024-12-04 PROCEDURE — 99211 OFF/OP EST MAY X REQ PHY/QHP: CPT

## 2024-12-04 PROCEDURE — 83540 ASSAY OF IRON: CPT

## 2024-12-04 PROCEDURE — 82607 VITAMIN B-12: CPT

## 2024-12-04 PROCEDURE — 81206 BCR/ABL1 GENE MAJOR BP: CPT

## 2024-12-04 PROCEDURE — 82728 ASSAY OF FERRITIN: CPT

## 2024-12-04 ASSESSMENT — ENCOUNTER SYMPTOMS
LIGHT-HEADEDNESS: 0
DIAPHORESIS: 0
TROUBLE SWALLOWING: 0
SLEEP DISTURBANCE: 0
WHEEZING: 0
FEVER: 1
DIARRHEA: 0
VOMITING: 0
ACTIVITY CHANGE: 0
FATIGUE: 1
BRUISES/BLEEDS EASILY: 0
HEADACHES: 1
APNEA: 0
SORE THROAT: 1
CHILLS: 1
ABDOMINAL DISTENTION: 0
ADENOPATHY: 0
BACK PAIN: 0
SHORTNESS OF BREATH: 0
ABDOMINAL PAIN: 0
UNEXPECTED WEIGHT CHANGE: 0
CONFUSION: 0
NAUSEA: 0
DIZZINESS: 0
WEAKNESS: 1
CHEST TIGHTNESS: 0
CONSTIPATION: 0
SPEECH DIFFICULTY: 0
APPETITE CHANGE: 0
COUGH: 0
VOICE CHANGE: 1
BLOOD IN STOOL: 0
CHOKING: 0

## 2024-12-04 ASSESSMENT — PATIENT HEALTH QUESTIONNAIRE - PHQ9
SUM OF ALL RESPONSES TO PHQ9 QUESTIONS 1 AND 2: 0
SUM OF ALL RESPONSES TO PHQ9 QUESTIONS 1 AND 2: 0
CLINICAL INTERPRETATION OF PHQ2 SCORE: NO FURTHER SCREENING NEEDED
1. LITTLE INTEREST OR PLEASURE IN DOING THINGS: NOT AT ALL
2. FEELING DOWN, DEPRESSED OR HOPELESS: NOT AT ALL

## 2024-12-04 ASSESSMENT — PAIN SCALES - GENERAL: PAINLEVEL: 0

## 2024-12-05 DIAGNOSIS — Z12.39 SCREENING FOR BREAST CANCER: ICD-10-CM

## 2024-12-05 DIAGNOSIS — R10.2 PELVIC PAIN: Primary | ICD-10-CM

## 2024-12-05 DIAGNOSIS — R92.30 DENSE BREAST: ICD-10-CM

## 2024-12-05 LAB — VIA MED ONC PATHWAYS TAG: NORMAL

## 2024-12-12 ENCOUNTER — LAB ENCOUNTER (OUTPATIENT)
Dept: LAB | Facility: HOSPITAL | Age: 44
End: 2024-12-12
Attending: INTERNAL MEDICINE
Payer: COMMERCIAL

## 2024-12-12 DIAGNOSIS — J02.9 ACUTE PHARYNGITIS: ICD-10-CM

## 2024-12-12 DIAGNOSIS — R05.9 COUGH: ICD-10-CM

## 2024-12-12 DIAGNOSIS — R07.0 THROAT PAIN: ICD-10-CM

## 2024-12-12 DIAGNOSIS — Z22.322 CARRIER OR SUSPECTED CARRIER OF METHICILLIN RESISTANT STAPHYLOCOCCUS AUREUS: Primary | ICD-10-CM

## 2024-12-12 DIAGNOSIS — J02.0 STREPTOCOCCAL SORE THROAT: ICD-10-CM

## 2024-12-12 PROCEDURE — 87641 MR-STAPH DNA AMP PROBE: CPT

## 2024-12-12 PROCEDURE — 87637 SARSCOV2&INF A&B&RSV AMP PRB: CPT

## 2024-12-12 PROCEDURE — 87081 CULTURE SCREEN ONLY: CPT

## 2024-12-13 LAB
FLUAV + FLUBV RNA SPEC NAA+PROBE: NOT DETECTED
FLUAV + FLUBV RNA SPEC NAA+PROBE: NOT DETECTED
MRSA DNA SPEC QL NAA+PROBE: NEGATIVE
RSV RNA SPEC NAA+PROBE: NOT DETECTED
SARS-COV-2 RNA RESP QL NAA+PROBE: NOT DETECTED

## 2024-12-26 ENCOUNTER — APPOINTMENT (OUTPATIENT)
Dept: GENERAL RADIOLOGY | Age: 44
End: 2024-12-26

## 2024-12-26 ENCOUNTER — APPOINTMENT (OUTPATIENT)
Dept: ULTRASOUND IMAGING | Age: 44
End: 2024-12-26
Attending: INTERNAL MEDICINE

## 2024-12-27 ENCOUNTER — HOSPITAL ENCOUNTER (OUTPATIENT)
Dept: ULTRASOUND IMAGING | Age: 44
End: 2024-12-27
Attending: INTERNAL MEDICINE

## 2024-12-27 DIAGNOSIS — R10.9 ABDOMINAL CRAMPING: ICD-10-CM

## 2024-12-27 PROCEDURE — 76856 US EXAM PELVIC COMPLETE: CPT

## 2025-01-06 ENCOUNTER — TELEPHONE (OUTPATIENT)
Dept: HEMATOLOGY/ONCOLOGY | Age: 45
End: 2025-01-06

## 2025-03-04 DIAGNOSIS — D64.9 ANEMIA, UNSPECIFIED TYPE: Primary | ICD-10-CM

## 2025-03-04 DIAGNOSIS — C92.11 CML IN REMISSION  (CMD): ICD-10-CM

## 2025-03-05 ENCOUNTER — LAB SERVICES (OUTPATIENT)
Dept: LAB | Age: 45
End: 2025-03-05
Attending: INTERNAL MEDICINE

## 2025-03-05 ENCOUNTER — OFFICE VISIT (OUTPATIENT)
Dept: HEMATOLOGY/ONCOLOGY | Age: 45
End: 2025-03-05
Attending: INTERNAL MEDICINE

## 2025-03-05 VITALS
DIASTOLIC BLOOD PRESSURE: 66 MMHG | OXYGEN SATURATION: 98 % | WEIGHT: 152.3 LBS | HEART RATE: 71 BPM | BODY MASS INDEX: 24.48 KG/M2 | TEMPERATURE: 98 F | RESPIRATION RATE: 18 BRPM | HEIGHT: 66 IN | SYSTOLIC BLOOD PRESSURE: 102 MMHG

## 2025-03-05 DIAGNOSIS — R10.9 ABDOMINAL CRAMPING: ICD-10-CM

## 2025-03-05 DIAGNOSIS — C92.11 CML IN REMISSION  (CMD): ICD-10-CM

## 2025-03-05 DIAGNOSIS — M54.40 ACUTE BILATERAL LOW BACK PAIN WITH SCIATICA, SCIATICA LATERALITY UNSPECIFIED: ICD-10-CM

## 2025-03-05 DIAGNOSIS — D64.9 ANEMIA, UNSPECIFIED TYPE: Primary | ICD-10-CM

## 2025-03-05 DIAGNOSIS — D64.9 ANEMIA, UNSPECIFIED TYPE: ICD-10-CM

## 2025-03-05 DIAGNOSIS — D53.9 NUTRITIONAL ANEMIA, UNSPECIFIED: ICD-10-CM

## 2025-03-05 LAB
ALBUMIN SERPL-MCNC: 3.9 G/DL (ref 3.4–5)
ALBUMIN/GLOB SERPL: 1.2 {RATIO} (ref 1–2.4)
ALP SERPL-CCNC: 60 UNITS/L (ref 45–117)
ALT SERPL-CCNC: 26 UNITS/L
ANION GAP SERPL CALC-SCNC: 10 MMOL/L (ref 7–19)
AST SERPL-CCNC: 29 UNITS/L
BASOPHILS # BLD: 0 K/MCL (ref 0–0.3)
BASOPHILS NFR BLD: 1 %
BILIRUB SERPL-MCNC: 0.5 MG/DL (ref 0.2–1)
BUN SERPL-MCNC: <5 MG/DL (ref 6–20)
BUN/CREAT SERPL: ABNORMAL
CALCIUM SERPL-MCNC: 8.9 MG/DL (ref 8.4–10.2)
CHLORIDE SERPL-SCNC: 109 MMOL/L (ref 97–110)
CO2 SERPL-SCNC: 25 MMOL/L (ref 21–32)
CREAT SERPL-MCNC: 0.59 MG/DL (ref 0.51–0.95)
DEPRECATED RDW RBC: 43.8 FL (ref 39–50)
EGFRCR SERPLBLD CKD-EPI 2021: >90 ML/MIN/{1.73_M2}
EOSINOPHIL # BLD: 0.1 K/MCL (ref 0–0.5)
EOSINOPHIL NFR BLD: 2 %
ERYTHROCYTE [DISTWIDTH] IN BLOOD: 12.9 % (ref 11–15)
FASTING DURATION TIME PATIENT: ABNORMAL H
FERRITIN SERPL-MCNC: 39 NG/ML (ref 8–252)
FOLATE SERPL-MCNC: 13.2 NG/ML
GLOBULIN SER-MCNC: 3.3 G/DL (ref 2–4)
GLUCOSE SERPL-MCNC: 79 MG/DL (ref 70–99)
HCT VFR BLD CALC: 36.4 % (ref 36–46.5)
HGB BLD-MCNC: 12.1 G/DL (ref 12–15.5)
IMM GRANULOCYTES # BLD AUTO: 0 K/MCL (ref 0–0.2)
IMM GRANULOCYTES # BLD: 0 %
IRON SATN MFR SERPL: 35 % (ref 15–45)
IRON SERPL-MCNC: 114 MCG/DL (ref 50–170)
LYMPHOCYTES # BLD: 1.1 K/MCL (ref 1–4.8)
LYMPHOCYTES NFR BLD: 30 %
MCH RBC QN AUTO: 30.6 PG (ref 26–34)
MCHC RBC AUTO-ENTMCNC: 33.2 G/DL (ref 32–36.5)
MCV RBC AUTO: 91.9 FL (ref 78–100)
MONOCYTES # BLD: 0.4 K/MCL (ref 0.3–0.9)
MONOCYTES NFR BLD: 10 %
NEUTROPHILS # BLD: 2 K/MCL (ref 1.8–7.7)
NEUTROPHILS NFR BLD: 57 %
NRBC BLD MANUAL-RTO: 0 /100 WBC
PLATELET # BLD AUTO: 305 K/MCL (ref 140–450)
POTASSIUM SERPL-SCNC: 4.2 MMOL/L (ref 3.4–5.1)
PROT SERPL-MCNC: 7.2 G/DL (ref 6.4–8.2)
RBC # BLD: 3.96 MIL/MCL (ref 4–5.2)
SODIUM SERPL-SCNC: 140 MMOL/L (ref 135–145)
TIBC SERPL-MCNC: 330 MCG/DL (ref 250–450)
VIA MED ONC PATHWAYS TAG: NORMAL
VIT B12 SERPL-MCNC: 953 PG/ML (ref 211–911)
WBC # BLD: 3.5 K/MCL (ref 4.2–11)

## 2025-03-05 PROCEDURE — 99215 OFFICE O/P EST HI 40 MIN: CPT | Performed by: INTERNAL MEDICINE

## 2025-03-05 PROCEDURE — 82607 VITAMIN B-12: CPT

## 2025-03-05 PROCEDURE — 85025 COMPLETE CBC W/AUTO DIFF WBC: CPT

## 2025-03-05 PROCEDURE — 99211 OFF/OP EST MAY X REQ PHY/QHP: CPT

## 2025-03-05 PROCEDURE — 83540 ASSAY OF IRON: CPT

## 2025-03-05 PROCEDURE — 81206 BCR/ABL1 GENE MAJOR BP: CPT

## 2025-03-05 PROCEDURE — 80053 COMPREHEN METABOLIC PANEL: CPT

## 2025-03-05 PROCEDURE — 82728 ASSAY OF FERRITIN: CPT

## 2025-03-05 PROCEDURE — 36415 COLL VENOUS BLD VENIPUNCTURE: CPT

## 2025-03-05 ASSESSMENT — ENCOUNTER SYMPTOMS
HEADACHES: 1
ADENOPATHY: 0
ABDOMINAL PAIN: 0
BRUISES/BLEEDS EASILY: 0
SPEECH DIFFICULTY: 0
CONFUSION: 0
DIARRHEA: 0
COUGH: 0
VOICE CHANGE: 1
FATIGUE: 1
SORE THROAT: 1
CONSTIPATION: 0
DIAPHORESIS: 0
SHORTNESS OF BREATH: 0
ACTIVITY CHANGE: 0
FEVER: 1
CHOKING: 0
SLEEP DISTURBANCE: 0
NAUSEA: 0
DIZZINESS: 0
WEAKNESS: 1
LIGHT-HEADEDNESS: 0
APPETITE CHANGE: 0
ABDOMINAL DISTENTION: 0
CHEST TIGHTNESS: 0
WHEEZING: 0
APNEA: 0
TROUBLE SWALLOWING: 0
BLOOD IN STOOL: 0
CHILLS: 1
UNEXPECTED WEIGHT CHANGE: 0
BACK PAIN: 0
VOMITING: 0

## 2025-03-05 ASSESSMENT — PAIN SCALES - GENERAL: PAINLEVEL: 5

## 2025-03-05 ASSESSMENT — PATIENT HEALTH QUESTIONNAIRE - PHQ9
SUM OF ALL RESPONSES TO PHQ9 QUESTIONS 1 AND 2: 1
CLINICAL INTERPRETATION OF PHQ2 SCORE: NO FURTHER SCREENING NEEDED
SUM OF ALL RESPONSES TO PHQ9 QUESTIONS 1 AND 2: 1
2. FEELING DOWN, DEPRESSED OR HOPELESS: SEVERAL DAYS
1. LITTLE INTEREST OR PLEASURE IN DOING THINGS: NOT AT ALL

## 2025-04-21 DIAGNOSIS — C92.11 CML IN REMISSION  (CMD): ICD-10-CM

## 2025-04-21 RX ORDER — IMATINIB MESYLATE 400 MG/1
400 TABLET, FILM COATED ORAL DAILY
Qty: 90 TABLET | Refills: 0 | Status: ACTIVE | OUTPATIENT
Start: 2025-04-21

## 2025-05-21 ENCOUNTER — LAB ENCOUNTER (OUTPATIENT)
Dept: LAB | Age: 45
End: 2025-05-21
Attending: PHYSICIAN ASSISTANT
Payer: COMMERCIAL

## 2025-05-21 ENCOUNTER — TELEPHONE (OUTPATIENT)
Dept: FAMILY MEDICINE CLINIC | Facility: CLINIC | Age: 45
End: 2025-05-21

## 2025-05-21 ENCOUNTER — HOSPITAL ENCOUNTER (OUTPATIENT)
Dept: GENERAL RADIOLOGY | Age: 45
Discharge: HOME | End: 2025-05-21
Attending: PHYSICIAN ASSISTANT
Payer: COMMERCIAL

## 2025-05-21 ENCOUNTER — OFFICE VISIT (OUTPATIENT)
Dept: FAMILY MEDICINE CLINIC | Facility: CLINIC | Age: 45
End: 2025-05-21

## 2025-05-21 VITALS
HEART RATE: 69 BPM | WEIGHT: 178 LBS | DIASTOLIC BLOOD PRESSURE: 71 MMHG | SYSTOLIC BLOOD PRESSURE: 116 MMHG | BODY MASS INDEX: 29 KG/M2

## 2025-05-21 DIAGNOSIS — M25.442 SWELLING OF FINGER JOINT OF LEFT HAND: ICD-10-CM

## 2025-05-21 DIAGNOSIS — R30.0 DYSURIA: ICD-10-CM

## 2025-05-21 DIAGNOSIS — N12 PYELONEPHRITIS: Primary | ICD-10-CM

## 2025-05-21 DIAGNOSIS — R82.90 ABNORMAL URINALYSIS: ICD-10-CM

## 2025-05-21 LAB
BILIRUBIN: NEGATIVE
CRP SERPL-MCNC: <0.4 MG/DL (ref ?–1)
ERYTHROCYTE [SEDIMENTATION RATE] IN BLOOD: 3 MM/HR (ref 0–20)
GLUCOSE (URINE DIPSTICK): NEGATIVE MG/DL
KETONES (URINE DIPSTICK): 15 MG/DL
MULTISTIX LOT#: ABNORMAL NUMERIC
NITRITE, URINE: NEGATIVE
PH, URINE: 5.5 (ref 4.5–8)
PROTEIN (URINE DIPSTICK): 30 MG/DL
RHEUMATOID FACT SERPL-ACNC: <3.5 IU/ML (ref ?–14)
SPECIFIC GRAVITY: 1.01 (ref 1–1.03)
UROBILINOGEN,SEMI-QN: 0.2 MG/DL (ref 0–1.9)

## 2025-05-21 PROCEDURE — 86140 C-REACTIVE PROTEIN: CPT

## 2025-05-21 PROCEDURE — 3078F DIAST BP <80 MM HG: CPT | Performed by: PHYSICIAN ASSISTANT

## 2025-05-21 PROCEDURE — 3074F SYST BP LT 130 MM HG: CPT | Performed by: PHYSICIAN ASSISTANT

## 2025-05-21 PROCEDURE — 85652 RBC SED RATE AUTOMATED: CPT

## 2025-05-21 PROCEDURE — 99213 OFFICE O/P EST LOW 20 MIN: CPT | Performed by: PHYSICIAN ASSISTANT

## 2025-05-21 PROCEDURE — 86431 RHEUMATOID FACTOR QUANT: CPT

## 2025-05-21 PROCEDURE — 36415 COLL VENOUS BLD VENIPUNCTURE: CPT

## 2025-05-21 PROCEDURE — 86038 ANTINUCLEAR ANTIBODIES: CPT

## 2025-05-21 PROCEDURE — 73140 X-RAY EXAM OF FINGER(S): CPT | Performed by: PHYSICIAN ASSISTANT

## 2025-05-21 PROCEDURE — 86225 DNA ANTIBODY NATIVE: CPT

## 2025-05-21 PROCEDURE — 81003 URINALYSIS AUTO W/O SCOPE: CPT | Performed by: PHYSICIAN ASSISTANT

## 2025-05-21 RX ORDER — SULFAMETHOXAZOLE AND TRIMETHOPRIM 800; 160 MG/1; MG/1
1 TABLET ORAL 2 TIMES DAILY
Qty: 14 TABLET | Refills: 0 | Status: SHIPPED | OUTPATIENT
Start: 2025-05-21 | End: 2025-05-28

## 2025-05-21 RX ORDER — PREDNISONE 20 MG/1
20 TABLET ORAL DAILY
Qty: 7 TABLET | Refills: 0 | Status: SHIPPED | OUTPATIENT
Start: 2025-05-21 | End: 2025-05-28

## 2025-05-21 NOTE — PROGRESS NOTES
HPI:     Dysuria   This is a new problem. Episode onset: 4 days. The problem occurs every urination. The problem has been gradually worsening. The quality of the pain is described as aching and burning. There has been no fever. She is Sexually active. There is No history of pyelonephritis. Associated symptoms include frequency and urgency. Pertinent negatives include no chills, discharge, flank pain, hematuria, hesitancy, nausea or vomiting.   The patient complains of pain and swelling of her left 2 nd finger today. She denies fever, injury to her hand, or weakness.    Medications:   Current Medications[1]    Allergies:   Allergies[2]    History:     Health Maintenance   Topic Date Due    Colorectal Cancer Screening  Never done    Mammogram  Never done    DTaP,Tdap,and Td Vaccines (1 - Tdap) Never done    Pap Smear  07/14/2017    COVID-19 Vaccine (3 - 2024-25 season) 09/01/2024    Annual Physical  09/30/2024    Annual Depression Screening  01/01/2025    Influenza Vaccine (Season Ended) 10/01/2025    Pneumococcal Vaccine: Birth to 50yrs  Aged Out    Meningococcal B Vaccine  Aged Out       Patient's last menstrual period was 04/30/2025 (approximate).   Past Medical History:   Past Medical History[3]    Past Surgical History:   Past Surgical History[4]    Family History:   Family History[5]    Social History:     Social History     Socioeconomic History    Marital status:      Spouse name: Not on file    Number of children: Not on file    Years of education: Not on file    Highest education level: Not on file   Occupational History    Not on file   Tobacco Use    Smoking status: Never    Smokeless tobacco: Never   Vaping Use    Vaping status: Never Used   Substance and Sexual Activity    Alcohol use: Yes     Alcohol/week: 0.0 - 1.0 standard drinks of alcohol     Comment: social.  1-2 drinks monthly    Drug use: No    Sexual activity: Yes     Partners: Male     Birth control/protection: Condom     Comment:     Other Topics Concern     Service No    Blood Transfusions No    Caffeine Concern No    Occupational Exposure Yes     Comment:     Hobby Hazards No    Sleep Concern No    Stress Concern No    Weight Concern No    Special Diet No    Back Care Yes     Comment: 2 ruptured disc    Exercise No    Bike Helmet Not Asked    Seat Belt Yes    Self-Exams Yes   Social History Narrative    2014:  Lives with , 2 sons- ages 10, 13. 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     Social Drivers of Health     Food Insecurity: Not on file   Transportation Needs: Not on file   Stress: Not on file   Housing Stability: Low Risk  (7/7/2021)    Received from [facility]    Housing Stability     Mortgage Payment Concerns?: Not on file     Number of Places Lived in the Last Year: Not on file     Unstable Housing?: Not on file       Review of Systems:   Review of Systems   Constitutional:  Negative for chills.   Gastrointestinal:  Negative for nausea and vomiting.   Genitourinary:  Positive for dysuria, frequency and urgency. Negative for flank pain, hematuria and hesitancy.        Vitals:    05/21/25 1445   BP: 116/71   Pulse: 69   Weight: 178 lb (80.7 kg)     Body mass index is 28.73 kg/m².    Physical Exam:   Physical Exam  Vitals reviewed.   Abdominal:      General: Abdomen is flat. Bowel sounds are normal. There is no distension.      Palpations: Abdomen is soft.      Tenderness: There is abdominal tenderness in the suprapubic area. There is right CVA tenderness. There is no left CVA tenderness.      Left 2nd finger: + swelling and tenderness to palpation, no erythema.    Assessment and Plan::     Assessment & Plan  Swelling of finger joint of left hand    Orders:    C-Reactive Protein; Future; Expected date: 05/21/2025    Rheumatoid Arthritis Factor; Future; Expected date: 05/21/2025    Sed Stephanie Yu (Automated); Future; Expected date: 05/21/2025    Connective Tissue Disease (JEFF) Screen; Future;  Expected date: 2025    XR FINGER(S) (MIN 2 VIEWS), LEFT 2ND (CPT=73140); Future; Expected date: 2025    predniSONE; Take 1 tablet (20 mg total) by mouth daily for 7 days.  Dispense: 7 tablet; Refill: 0    Abnormal urinalysis    Orders:    Urine Culture, Routine; Future; Expected date: 2025    Urine Culture, Routine    Dysuria    Orders:    POC Urinalysis, Automated Dip without microscopy (PCA and [facility] ONLY) [33842]    Pyelonephritis    Orders:    POC Urinalysis, Automated Dip without microscopy (PCA and [facility] ONLY) [77690]    Sulfamethoxazole-Trimethoprim; Take 1 tablet by mouth 2 (two) times daily for 7 days.  Dispense: 14 tablet; Refill: 0       Discussed plan of care with pt and pt is in agreement.All questions answered. Pt to call with questions or concerns.         [1]   Current Outpatient Medications   Medication Sig Dispense Refill    sulfamethoxazole-trimethoprim -160 MG Oral Tab per tablet Take 1 tablet by mouth 2 (two) times daily for 7 days. 14 tablet 0    predniSONE 20 MG Oral Tab Take 1 tablet (20 mg total) by mouth daily for 7 days. 7 tablet 0    DULoxetine 20 MG Oral Cap DR Particles Take 1 capsule (20 mg total) by mouth daily. 90 capsule 0    Imatinib Mesylate 400 MG Oral Tab Take 1 tablet (400 mg total) by mouth daily.     [2]   Allergies  Allergen Reactions    Pcn [Penicillins] ANAPHYLAXIS   [3]   Past Medical History:   Disc disorder    MRI lumbar spine     Familial Mediterranean fever (HCC)    Fibrocystic breast changes    Mastoiditis    by CT temporal bones     Multiple thyroid nodules   [4]   Past Surgical History:  Procedure Laterality Date    Biopsy of thyroid,percut  2011    Dr Garcia- path neg    Colonoscopy      2010    D & c            x2    Other  2010    laparoscopy due to pelvic pain- lysis of adhesions and bx of 2 nodules. path consistent with epithelial cysts    Tonsillectomy      Dr Fahad Hernandez   [5]   Family History  Problem  Relation Age of Onset    Diabetes Father     Other (Other[other]) Father     Cancer Mother 52        ovarian ca d 52    Hypertension Mother     Cancer Maternal Aunt         breast dx age 54    Other (Other[other]) Other         no FHx colon CA.  May people on her father's side d brain CA    Other (fall[other]) Maternal Grandfather     Heart Disease Paternal Grandmother     Cancer Paternal Grandmother         brain cancers on dad's side

## 2025-05-21 NOTE — TELEPHONE ENCOUNTER
Patient calling ( name and date of birth of patient verified )     States has an appointment today , asking if there is anything sooner    Reviewed schedules no other appointments available    Patient verbalizes understanding   Future Appointments   Date Time Provider Department Center   5/21/2025  3:15 PM [facility]

## 2025-05-22 LAB
DSDNA IGG SERPL IA-ACNC: <0.6 IU/ML (ref ?–10)
ENA AB SER QL IA: <0.09 UG/L (ref ?–0.7)
ENA AB SER QL IA: NEGATIVE

## 2025-06-03 DIAGNOSIS — D64.9 ANEMIA, UNSPECIFIED TYPE: Primary | ICD-10-CM

## 2025-06-03 DIAGNOSIS — C92.11 CML IN REMISSION  (CMD): ICD-10-CM

## 2025-06-04 ENCOUNTER — LAB SERVICES (OUTPATIENT)
Dept: LAB | Age: 45
End: 2025-06-04
Attending: INTERNAL MEDICINE

## 2025-06-04 ENCOUNTER — OFFICE VISIT (OUTPATIENT)
Dept: HEMATOLOGY/ONCOLOGY | Age: 45
End: 2025-06-04
Attending: INTERNAL MEDICINE

## 2025-06-04 VITALS
RESPIRATION RATE: 16 BRPM | BODY MASS INDEX: 24.12 KG/M2 | DIASTOLIC BLOOD PRESSURE: 64 MMHG | SYSTOLIC BLOOD PRESSURE: 119 MMHG | TEMPERATURE: 97.8 F | WEIGHT: 150.1 LBS | HEIGHT: 66 IN | HEART RATE: 78 BPM | OXYGEN SATURATION: 99 %

## 2025-06-04 DIAGNOSIS — D64.9 ANEMIA, UNSPECIFIED TYPE: Primary | ICD-10-CM

## 2025-06-04 DIAGNOSIS — C92.11 CML IN REMISSION  (CMD): ICD-10-CM

## 2025-06-04 DIAGNOSIS — D64.9 ANEMIA, UNSPECIFIED TYPE: ICD-10-CM

## 2025-06-04 LAB
ALBUMIN SERPL-MCNC: 4 G/DL (ref 3.4–5)
ALBUMIN/GLOB SERPL: 1.4 {RATIO} (ref 1–2.4)
ALP SERPL-CCNC: 57 UNITS/L (ref 45–117)
ALT SERPL-CCNC: 32 UNITS/L
ANION GAP SERPL CALC-SCNC: 9 MMOL/L (ref 7–19)
AST SERPL-CCNC: 31 UNITS/L
BASOPHILS # BLD: 0 K/MCL (ref 0–0.3)
BASOPHILS NFR BLD: 0 %
BILIRUB SERPL-MCNC: 0.3 MG/DL (ref 0.2–1)
BUN SERPL-MCNC: 10 MG/DL (ref 6–20)
BUN/CREAT SERPL: 16 (ref 7–25)
CALCIUM SERPL-MCNC: 8.9 MG/DL (ref 8.4–10.2)
CHLORIDE SERPL-SCNC: 107 MMOL/L (ref 97–110)
CO2 SERPL-SCNC: 26 MMOL/L (ref 21–32)
CREAT SERPL-MCNC: 0.62 MG/DL (ref 0.51–0.95)
DEPRECATED RDW RBC: 49.8 FL (ref 39–50)
EGFRCR SERPLBLD CKD-EPI 2021: >90 ML/MIN/{1.73_M2}
EOSINOPHIL # BLD: 0.1 K/MCL (ref 0–0.5)
EOSINOPHIL NFR BLD: 1 %
ERYTHROCYTE [DISTWIDTH] IN BLOOD: 13.9 % (ref 11–15)
FASTING DURATION TIME PATIENT: NORMAL H
FERRITIN SERPL-MCNC: 35 NG/ML (ref 8–252)
FOLATE SERPL-MCNC: 8.3 NG/ML
GLOBULIN SER-MCNC: 2.9 G/DL (ref 2–4)
GLUCOSE SERPL-MCNC: 93 MG/DL (ref 70–99)
HCT VFR BLD CALC: 34.7 % (ref 36–46.5)
HGB BLD-MCNC: 11.4 G/DL (ref 12–15.5)
IMM GRANULOCYTES # BLD AUTO: 0 K/MCL (ref 0–0.2)
IMM GRANULOCYTES # BLD: 0 %
IRON SATN MFR SERPL: 21 % (ref 15–45)
IRON SERPL-MCNC: 69 MCG/DL (ref 50–170)
LYMPHOCYTES # BLD: 1.4 K/MCL (ref 1–4.8)
LYMPHOCYTES NFR BLD: 21 %
MCH RBC QN AUTO: 31.8 PG (ref 26–34)
MCHC RBC AUTO-ENTMCNC: 32.9 G/DL (ref 32–36.5)
MCV RBC AUTO: 96.9 FL (ref 78–100)
MONOCYTES # BLD: 0.3 K/MCL (ref 0.3–0.9)
MONOCYTES NFR BLD: 5 %
NEUTROPHILS # BLD: 5 K/MCL (ref 1.8–7.7)
NEUTROPHILS NFR BLD: 73 %
NRBC BLD MANUAL-RTO: 0 /100 WBC
PLATELET # BLD AUTO: 244 K/MCL (ref 140–450)
POTASSIUM SERPL-SCNC: 3.6 MMOL/L (ref 3.4–5.1)
PROT SERPL-MCNC: 6.9 G/DL (ref 6.4–8.2)
RBC # BLD: 3.58 MIL/MCL (ref 4–5.2)
SODIUM SERPL-SCNC: 138 MMOL/L (ref 135–145)
TIBC SERPL-MCNC: 323 MCG/DL (ref 250–450)
VIT B12 SERPL-MCNC: 790 PG/ML (ref 211–911)
WBC # BLD: 6.8 K/MCL (ref 4.2–11)

## 2025-06-04 PROCEDURE — 85025 COMPLETE CBC W/AUTO DIFF WBC: CPT

## 2025-06-04 PROCEDURE — 82607 VITAMIN B-12: CPT

## 2025-06-04 PROCEDURE — 81206 BCR/ABL1 GENE MAJOR BP: CPT

## 2025-06-04 PROCEDURE — 83540 ASSAY OF IRON: CPT

## 2025-06-04 PROCEDURE — 36415 COLL VENOUS BLD VENIPUNCTURE: CPT

## 2025-06-04 PROCEDURE — 82728 ASSAY OF FERRITIN: CPT

## 2025-06-04 PROCEDURE — 99211 OFF/OP EST MAY X REQ PHY/QHP: CPT

## 2025-06-04 PROCEDURE — 80053 COMPREHEN METABOLIC PANEL: CPT

## 2025-06-04 PROCEDURE — 99215 OFFICE O/P EST HI 40 MIN: CPT | Performed by: INTERNAL MEDICINE

## 2025-06-04 ASSESSMENT — ENCOUNTER SYMPTOMS
WHEEZING: 0
SORE THROAT: 1
CHILLS: 1
VOMITING: 0
ADENOPATHY: 0
COUGH: 0
ABDOMINAL PAIN: 0
CONFUSION: 0
BRUISES/BLEEDS EASILY: 0
APNEA: 0
WEAKNESS: 1
NAUSEA: 0
FATIGUE: 1
SPEECH DIFFICULTY: 0
SLEEP DISTURBANCE: 0
VOICE CHANGE: 1
BLOOD IN STOOL: 0
LIGHT-HEADEDNESS: 0
CHOKING: 0
CONSTIPATION: 0
ABDOMINAL DISTENTION: 0
DIAPHORESIS: 0
TROUBLE SWALLOWING: 0
SHORTNESS OF BREATH: 0
ACTIVITY CHANGE: 0
HEADACHES: 1
CHEST TIGHTNESS: 0
UNEXPECTED WEIGHT CHANGE: 0
FEVER: 1
APPETITE CHANGE: 0
DIZZINESS: 0
BACK PAIN: 0
DIARRHEA: 0

## 2025-06-04 ASSESSMENT — PAIN SCALES - GENERAL: PAINLEVEL_OUTOF10: 0

## 2025-06-04 ASSESSMENT — PATIENT HEALTH QUESTIONNAIRE - PHQ9: SUM OF ALL RESPONSES TO PHQ9 QUESTIONS 1 AND 2: 0

## 2025-06-13 ENCOUNTER — ORDER TRANSCRIPTION (OUTPATIENT)
Dept: ADMINISTRATIVE | Facility: HOSPITAL | Age: 45
End: 2025-06-13

## 2025-06-13 DIAGNOSIS — R42 DIZZINESS AND GIDDINESS: ICD-10-CM

## 2025-06-13 DIAGNOSIS — R00.2 PALPITATIONS: Primary | ICD-10-CM

## 2025-06-14 ENCOUNTER — LAB ENCOUNTER (OUTPATIENT)
Dept: LAB | Age: 45
End: 2025-06-14
Attending: INTERNAL MEDICINE
Payer: COMMERCIAL

## 2025-06-14 DIAGNOSIS — R79.1 ABNORMAL COAGULATION PROFILE: ICD-10-CM

## 2025-06-14 DIAGNOSIS — R53.81 DEBILITY, UNSPECIFIED: ICD-10-CM

## 2025-06-14 DIAGNOSIS — D68.09: Primary | ICD-10-CM

## 2025-06-14 DIAGNOSIS — R53.83 FATIGUE: ICD-10-CM

## 2025-06-14 DIAGNOSIS — Z00.01 ABNORMAL PHYSICAL EVALUATION: ICD-10-CM

## 2025-06-14 DIAGNOSIS — E78.2 MIXED HYPERLIPIDEMIA: ICD-10-CM

## 2025-06-14 DIAGNOSIS — E79.0 URICACIDEMIA: ICD-10-CM

## 2025-06-14 DIAGNOSIS — Z12.11 SCREEN FOR COLON CANCER: ICD-10-CM

## 2025-06-14 DIAGNOSIS — E83.2 DISORDER OF ZINC METABOLISM: ICD-10-CM

## 2025-06-14 DIAGNOSIS — R23.3 SPONTANEOUS ECCHYMOSES: ICD-10-CM

## 2025-06-14 DIAGNOSIS — E11.9 DIABETES MELLITUS (HCC): ICD-10-CM

## 2025-06-14 DIAGNOSIS — C92.10 CHRONIC MYELOID LEUKEMIA (HCC): ICD-10-CM

## 2025-06-14 DIAGNOSIS — D68.09: ICD-10-CM

## 2025-06-14 DIAGNOSIS — Z13.89 SCREENING FOR OBESITY: ICD-10-CM

## 2025-06-14 DIAGNOSIS — R30.0 DYSURIA: ICD-10-CM

## 2025-06-14 DIAGNOSIS — E83.40 DISORDER OF MAGNESIUM METABOLISM: ICD-10-CM

## 2025-06-14 LAB
BILIRUB UR QL: NEGATIVE
CHOLEST SERPL-MCNC: 190 MG/DL (ref ?–200)
CLARITY UR: CLEAR
D DIMER PPP FEU-MCNC: 0.47 UG/ML FEU (ref ?–0.5)
EST. AVERAGE GLUCOSE BLD GHB EST-MCNC: 100 MG/DL (ref 68–126)
FASTING PATIENT LIPID ANSWER: YES
FIBRINOGEN PPP-MCNC: 266 MG/DL (ref 200–480)
GLUCOSE UR-MCNC: NORMAL MG/DL
HBA1C MFR BLD: 5.1 % (ref ?–5.7)
HCYS SERPL-SCNC: 6.9 UMOL/L (ref 3.7–13.9)
HDLC SERPL-MCNC: 63 MG/DL (ref 40–59)
HGB UR QL STRIP.AUTO: NEGATIVE
INR BLD: 0.95 (ref 0.8–1.2)
KETONES UR-MCNC: NEGATIVE MG/DL
LDH SERPL L TO P-CCNC: 262 U/L (ref 120–246)
LDLC SERPL CALC-MCNC: 114 MG/DL (ref ?–100)
LEUKOCYTE ESTERASE UR QL STRIP.AUTO: NEGATIVE
MAGNESIUM SERPL-MCNC: 2.2 MG/DL (ref 1.6–2.6)
NITRITE UR QL STRIP.AUTO: NEGATIVE
NONHDLC SERPL-MCNC: 127 MG/DL (ref ?–130)
PH UR: 8.5 [PH] (ref 5–8)
PROT UR-MCNC: 30 MG/DL
PROTHROMBIN TIME: 13.2 SECONDS (ref 11.6–14.8)
SP GR UR STRIP: 1.02 (ref 1–1.03)
T4 FREE SERPL-MCNC: 1.2 NG/DL (ref 0.8–1.7)
TRIGL SERPL-MCNC: 73 MG/DL (ref 30–149)
TSI SER-ACNC: 0.46 UIU/ML (ref 0.55–4.78)
URATE SERPL-MCNC: 2.2 MG/DL (ref 3.1–7.8)
UROBILINOGEN UR STRIP-ACNC: NORMAL
VIT D+METAB SERPL-MCNC: 31.1 NG/ML (ref 30–100)
VLDLC SERPL CALC-MCNC: 13 MG/DL (ref 0–30)

## 2025-06-14 PROCEDURE — 85390 FIBRINOLYSINS SCREEN I&R: CPT

## 2025-06-14 PROCEDURE — 85379 FIBRIN DEGRADATION QUANT: CPT

## 2025-06-14 PROCEDURE — 85384 FIBRINOGEN ACTIVITY: CPT

## 2025-06-14 PROCEDURE — 85730 THROMBOPLASTIN TIME PARTIAL: CPT | Performed by: INTERNAL MEDICINE

## 2025-06-14 PROCEDURE — 85246 CLOT FACTOR VIII VW ANTIGEN: CPT

## 2025-06-14 PROCEDURE — 85397 CLOTTING FUNCT ACTIVITY: CPT

## 2025-06-14 PROCEDURE — 83090 ASSAY OF HOMOCYSTEINE: CPT

## 2025-06-14 PROCEDURE — 84630 ASSAY OF ZINC: CPT

## 2025-06-14 PROCEDURE — 36415 COLL VENOUS BLD VENIPUNCTURE: CPT

## 2025-06-14 PROCEDURE — 84550 ASSAY OF BLOOD/URIC ACID: CPT

## 2025-06-14 PROCEDURE — 87086 URINE CULTURE/COLONY COUNT: CPT

## 2025-06-14 PROCEDURE — 82306 VITAMIN D 25 HYDROXY: CPT

## 2025-06-14 PROCEDURE — 85730 THROMBOPLASTIN TIME PARTIAL: CPT

## 2025-06-14 PROCEDURE — 85732 THROMBOPLASTIN TIME PARTIAL: CPT

## 2025-06-14 PROCEDURE — 80061 LIPID PANEL: CPT

## 2025-06-14 PROCEDURE — 84443 ASSAY THYROID STIM HORMONE: CPT

## 2025-06-14 PROCEDURE — 83735 ASSAY OF MAGNESIUM: CPT

## 2025-06-14 PROCEDURE — 81001 URINALYSIS AUTO W/SCOPE: CPT

## 2025-06-14 PROCEDURE — 84439 ASSAY OF FREE THYROXINE: CPT

## 2025-06-14 PROCEDURE — 83615 LACTATE (LD) (LDH) ENZYME: CPT

## 2025-06-14 PROCEDURE — 83695 ASSAY OF LIPOPROTEIN(A): CPT

## 2025-06-14 PROCEDURE — 85240 CLOT FACTOR VIII AHG 1 STAGE: CPT

## 2025-06-14 PROCEDURE — 85245 CLOT FACTOR VIII VW RISTOCTN: CPT

## 2025-06-14 PROCEDURE — 85300 ANTITHROMBIN III ACTIVITY: CPT

## 2025-06-14 PROCEDURE — 85610 PROTHROMBIN TIME: CPT

## 2025-06-14 PROCEDURE — 81015 MICROSCOPIC EXAM OF URINE: CPT

## 2025-06-14 PROCEDURE — 83036 HEMOGLOBIN GLYCOSYLATED A1C: CPT

## 2025-06-17 LAB
ANTITHROMBIN: 100 %
LIPOPROTEIN (A): 33.9 NMOL/L
VWF AG: 60 %

## 2025-06-18 LAB
FACTOR VIII ACT: 54 %
VON WILLEBRAND FACTOR ACTIVITY: 59 %
VON WILLEBRAND FACTOR ACTIVITY: 59 %

## 2025-06-20 LAB — ZINC: 60 UG/DL

## 2025-07-21 DIAGNOSIS — C92.11 CML IN REMISSION  (CMD): ICD-10-CM

## 2025-07-23 ENCOUNTER — NURSE TRIAGE (OUTPATIENT)
Dept: FAMILY MEDICINE CLINIC | Facility: CLINIC | Age: 45
End: 2025-07-23

## 2025-07-23 RX ORDER — IMATINIB MESYLATE 400 MG/1
400 TABLET, FILM COATED ORAL DAILY
Qty: 90 TABLET | Refills: 0 | Status: ACTIVE | OUTPATIENT
Start: 2025-07-23

## 2025-07-23 NOTE — TELEPHONE ENCOUNTER
Action Requested: Summary for Provider     []  Critical Lab, Recommendations Needed  [] Need Additional Advice  [x]   FYI    []   Need Orders  [] Need Medications Sent to Pharmacy  []  Other     SUMMARY: Per protocol, patient should be seen in the office today. No appointment available. Advised to go to [facility] or [facility] for evaluation and treatment but declines. States she is cancer patient and has a lot of bills to pay. She is concerned about the cost of visit. Relayed [facility] visit is the same as an office visit. Given address of [facility] in [place], open until 7:30. Call insurance if needed to check for cost.     Requests an appointment for tomorrow. Appointment scheduled.    Future Appointments   Date Time Provider Department Center   7/24/2025 12:40 PM Sas, Kathryn E., APRN [id number] [place]      Reason for call: Urinary Symptoms  Onset: 7/20    Patient reports of burning sensation and discomfort with urination for 3 days now. Also complains of back pain. States she had the same symptoms a few months ago and knows she has UTI. Requesting to get an order for urine testing. Relayed PCP is not in the office today and advised to schedule an appointment or go to [facility] or [facility].     Reason for Disposition   Side (flank) or lower back pain present    Protocols used: Urinary Symptoms-A-[place]

## 2025-07-24 ENCOUNTER — OFFICE VISIT (OUTPATIENT)
Dept: INTERNAL MEDICINE CLINIC | Facility: CLINIC | Age: 45
End: 2025-07-24

## 2025-07-24 VITALS
HEART RATE: 75 BPM | SYSTOLIC BLOOD PRESSURE: 120 MMHG | HEIGHT: 66 IN | OXYGEN SATURATION: 100 % | DIASTOLIC BLOOD PRESSURE: 72 MMHG | TEMPERATURE: 98 F | WEIGHT: 147 LBS | BODY MASS INDEX: 23.63 KG/M2

## 2025-07-24 DIAGNOSIS — N30.90 RECURRENT CYSTITIS: Primary | ICD-10-CM

## 2025-07-24 LAB
APPEARANCE: CLEAR
BILIRUB UR QL: NEGATIVE
BILIRUBIN: NEGATIVE
CLARITY UR: CLEAR
COLOR UR: YELLOW
GLUCOSE (URINE DIPSTICK): NEGATIVE MG/DL
GLUCOSE UR-MCNC: NORMAL MG/DL
HGB UR QL STRIP.AUTO: NEGATIVE
LEUKOCYTE ESTERASE UR QL STRIP.AUTO: NEGATIVE
LEUKOCYTES: NEGATIVE
MULTISTIX LOT#: ABNORMAL NUMERIC
NITRITE UR QL STRIP.AUTO: NEGATIVE
NITRITE, URINE: NEGATIVE
OCCULT BLOOD: NEGATIVE
PH UR: 5.5 [PH] (ref 5–8)
PH, URINE: 6 (ref 4.5–8)
PROT UR-MCNC: 20 MG/DL
PROTEIN (URINE DIPSTICK): 30 MG/DL
SP GR UR STRIP: 1.03 (ref 1–1.03)
SPECIFIC GRAVITY: 1.02 (ref 1–1.03)
URINE-COLOR: YELLOW
UROBILINOGEN UR STRIP-ACNC: NORMAL
UROBILINOGEN,SEMI-QN: 0.2 MG/DL (ref 0–1.9)

## 2025-07-24 PROCEDURE — 81002 URINALYSIS NONAUTO W/O SCOPE: CPT | Performed by: NURSE PRACTITIONER

## 2025-07-24 PROCEDURE — 99214 OFFICE O/P EST MOD 30 MIN: CPT | Performed by: NURSE PRACTITIONER

## 2025-07-24 PROCEDURE — 3078F DIAST BP <80 MM HG: CPT | Performed by: NURSE PRACTITIONER

## 2025-07-24 PROCEDURE — 3044F HG A1C LEVEL LT 7.0%: CPT | Performed by: NURSE PRACTITIONER

## 2025-07-24 PROCEDURE — 3074F SYST BP LT 130 MM HG: CPT | Performed by: NURSE PRACTITIONER

## 2025-07-24 PROCEDURE — 3008F BODY MASS INDEX DOCD: CPT | Performed by: NURSE PRACTITIONER

## 2025-07-24 RX ORDER — SULFAMETHOXAZOLE AND TRIMETHOPRIM 800; 160 MG/1; MG/1
1 TABLET ORAL 2 TIMES DAILY
Qty: 14 TABLET | Refills: 1 | Status: SHIPPED | OUTPATIENT
Start: 2025-07-24 | End: 2025-07-31

## 2025-07-24 RX ORDER — ERGOCALCIFEROL 1.25 MG/1
CAPSULE, LIQUID FILLED ORAL
COMMUNITY
Start: 2025-05-26

## 2025-07-24 NOTE — ASSESSMENT & PLAN NOTE
-Start Bactrim. Reviewed use. Repeat dose provided for upcoming travel, advised to seek care if sx occur while travelling.   -Referral to Urology for recurrent UTI sx.   -UA/UC ordered  -Hydrate, cranberry pills.   Orders:    POC Urinalysis, Manual Dip without microscopy [[id number]]    Urinalysis, Routine [E]; Future    Urine Culture, Routine [E]; Future    sulfamethoxazole-trimethoprim -160 MG Oral Tab per tablet; Take 1 tablet by mouth 2 (two) times daily for 7 days.    Urology Referral - In Network

## 2025-07-24 NOTE — PROGRESS NOTES
Liseth Bang is a 45 year old female.  HPI:   Pt reports recurrent UTI's   HX of CLL. Reports chronic chills and fatigue due to CLL since diagnosis in 2018.   Reports last UTI 5/2025.Used bactrim which helped.   Pt reports urinary pressure, some urgency, nausea.   Denies any fever, chills, flank pain, blood in urine.   Current Medications[1]   Past Medical History[2]   Social History:  Short Social Hx on File[3]     REVIEW OF SYSTEMS:   Review of Systems   Constitutional:  Negative for activity change, appetite change, chills, diaphoresis, fatigue, fever and unexpected weight change.   HENT:  Negative for congestion.    Eyes:  Negative for visual disturbance.   Respiratory:  Negative for cough, chest tightness, shortness of breath and wheezing.    Cardiovascular:  Negative for chest pain, palpitations and leg swelling.   Gastrointestinal:  Negative for abdominal pain, constipation, diarrhea, nausea and vomiting.   Endocrine: Negative.    Genitourinary:  Positive for dysuria and urgency. Negative for decreased urine volume, difficulty urinating, enuresis, flank pain, frequency, genital sores, menstrual problem, pelvic pain, vaginal bleeding, vaginal discharge and vaginal pain.   Musculoskeletal:  Negative for arthralgias and back pain.   Skin: Negative.    Neurological:  Negative for dizziness, seizures, numbness and headaches.   Hematological: Negative.    Psychiatric/Behavioral: Negative.            EXAM:   /72 (BP Location: Left arm, Patient Position: Sitting, Cuff Size: adult)   Pulse 75   Temp 98.3 °F (36.8 °C) (Temporal)   Ht 5' 6\" (1.676 m)   Wt 147 lb (66.7 kg)   LMP 07/14/2025 (Approximate)   SpO2 100%   BMI 23.73 kg/m²     Physical Exam  Vitals reviewed.   Constitutional:       General: She is not in acute distress.  Eyes:      General: No scleral icterus.     Conjunctiva/sclera: Conjunctivae normal.   Abdominal:      General: Abdomen is flat. Bowel sounds are normal. There is no distension.       Palpations: Abdomen is soft. There is no mass.      Tenderness: There is no abdominal tenderness. There is no right CVA tenderness, left CVA tenderness, guarding or rebound.      Hernia: No hernia is present.   Skin:     General: Skin is warm.      Coloration: Skin is not jaundiced.   Neurological:      Mental Status: She is alert.   Psychiatric:         Thought Content: Thought content normal.         Judgment: Judgment normal.            ASSESSMENT AND PLAN:     Assessment & Plan  Recurrent cystitis  -Start Bactrim. Reviewed use. Repeat dose provided for upcoming travel, advised to seek care if sx occur while travelling.   -Referral to Urology for recurrent UTI sx.   -UA/UC ordered  -Hydrate, cranberry pills.   Orders:    POC Urinalysis, Manual Dip without microscopy [[id number]]    Urinalysis, Routine [E]; Future    Urine Culture, Routine [E]; Future    sulfamethoxazole-trimethoprim -160 MG Oral Tab per tablet; Take 1 tablet by mouth 2 (two) times daily for 7 days.    Urology Referral - In Network         The patient indicates understanding of these issues and agrees to the plan.  The patient is asked to return in PRN/schedule annual physical asap with PCP.     The above note was creating using Dragon speech recognition technology. Please excuse any typos.         [1]   Current Outpatient Medications   Medication Sig Dispense Refill    ergocalciferol 1.25 MG (22928 UT) Oral Cap Take 1 capsule (50,000 units) by mouth ONCE weekly EVERY SUNDAY MORNING      sulfamethoxazole-trimethoprim -160 MG Oral Tab per tablet Take 1 tablet by mouth 2 (two) times daily for 7 days. 14 tablet 1    DULoxetine 20 MG Oral Cap DR Particles Take 1 capsule (20 mg total) by mouth daily. 90 capsule 0    Imatinib Mesylate 400 MG Oral Tab Take 1 tablet (400 mg total) by mouth daily.     [2]   Past Medical History:   Disc disorder    MRI lumbar spine 2011    Familial Mediterranean fever (HCC)    Fibrocystic breast changes     Mastoiditis    by CT temporal bones 2013    Multiple thyroid nodules   [3]   Social History  Socioeconomic History    Marital status:    Tobacco Use    Smoking status: Never    Smokeless tobacco: Never   Vaping Use    Vaping status: Never Used   Substance and Sexual Activity    Alcohol use: Yes     Alcohol/week: 0.0 - 1.0 standard drinks of alcohol     Comment: social.  1-2 drinks monthly    Drug use: No    Sexual activity: Yes     Partners: Male     Birth control/protection: Condom     Comment:    Other Topics Concern     Service No    Blood Transfusions No    Caffeine Concern No    Occupational Exposure Yes     Comment:     Hobby Hazards No    Sleep Concern No    Stress Concern No    Weight Concern No    Special Diet No    Back Care Yes     Comment: 2 ruptured disc    Exercise No    Seat Belt Yes    Self-Exams Yes   Social History Narrative    2014:  Lives with , 2 sons- ages 10, 13. 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     Social Drivers of Health      Received from [facility]    Housing Stability

## 2025-09-03 ENCOUNTER — OFFICE VISIT (OUTPATIENT)
Dept: HEMATOLOGY/ONCOLOGY | Age: 45
End: 2025-09-03
Attending: INTERNAL MEDICINE

## 2025-09-03 VITALS
SYSTOLIC BLOOD PRESSURE: 93 MMHG | TEMPERATURE: 97.2 F | RESPIRATION RATE: 15 BRPM | DIASTOLIC BLOOD PRESSURE: 62 MMHG | BODY MASS INDEX: 23.63 KG/M2 | WEIGHT: 147 LBS | HEART RATE: 63 BPM | HEIGHT: 66 IN | OXYGEN SATURATION: 97 %

## 2025-09-03 DIAGNOSIS — D64.9 ANEMIA, UNSPECIFIED TYPE: ICD-10-CM

## 2025-09-03 DIAGNOSIS — C92.11 CML IN REMISSION  (CMD): Primary | ICD-10-CM

## 2025-09-03 PROCEDURE — 99211 OFF/OP EST MAY X REQ PHY/QHP: CPT

## 2025-09-03 PROCEDURE — 99215 OFFICE O/P EST HI 40 MIN: CPT | Performed by: INTERNAL MEDICINE

## 2025-09-03 ASSESSMENT — ENCOUNTER SYMPTOMS
FEVER: 1
CONFUSION: 0
CHOKING: 0
LIGHT-HEADEDNESS: 0
HEADACHES: 1
SORE THROAT: 1
FATIGUE: 1
WHEEZING: 0
BRUISES/BLEEDS EASILY: 0
UNEXPECTED WEIGHT CHANGE: 0
ADENOPATHY: 0
SLEEP DISTURBANCE: 0
DIZZINESS: 0
DIARRHEA: 0
SPEECH DIFFICULTY: 0
SHORTNESS OF BREATH: 0
VOMITING: 0
DIAPHORESIS: 0
COUGH: 0
VOICE CHANGE: 1
TROUBLE SWALLOWING: 0
BLOOD IN STOOL: 0
CHEST TIGHTNESS: 0
ABDOMINAL DISTENTION: 0
ACTIVITY CHANGE: 0
NAUSEA: 0
ABDOMINAL PAIN: 0
APPETITE CHANGE: 0
APNEA: 0
CONSTIPATION: 0
BACK PAIN: 0
WEAKNESS: 1
CHILLS: 1

## 2025-09-03 ASSESSMENT — PATIENT HEALTH QUESTIONNAIRE - PHQ9: SUM OF ALL RESPONSES TO PHQ9 QUESTIONS 1 AND 2: 0

## 2025-09-03 ASSESSMENT — PAIN SCALES - GENERAL: PAINLEVEL_OUTOF10: 0

## 2025-09-04 LAB — VIA MED ONC PATHWAYS TAG: NORMAL

## (undated) NOTE — LETTER
AUTHORIZATION FOR SURGICAL OPERATION OR OTHER PROCEDURE    1. I hereby authorize Dr. Guero Fierro, and [place] [facility] [facility] staff assigned to my case to perform the following operation and/or procedure at the [facility], [facility]:    _______________________________________________________________________________________________      _______________________________________________________________________________________________    2. My physician has explained the nature and purpose of the operation or other procedure, possible alternative methods of treatment, the risks involved, and the possibility of complication to me. I acknowledge that no guarantee has been made as to the result that may be obtained. 3.  I recognize that, during the course of this operation, or other procedure, unforseen conditions may necessitate additional or different procedure than those listed above. I, therefore, further authorize and request that the above named physician, his/her physician assistants or designees perform such procedures as are, in his/her professional opinion, necessary and desirable. 4.  Any tissue or organs removed in the operation or other procedure may be disposed of by and at the discretion of the [facility], [facility] and [facility]. 5.  I understand that in the event of a medical emergency, I will be transported by local paramedics to [facility] or other [facility] emergency department. 6.  I certify that I have read and fully understand the above consent to operation and/or other procedure. 7.  I acknowledge that my physician has explained sedation/analgesia administration to me including the risks and benefits. I consent to the administration of sedation/analgesia as may be necessary or desirable in the judgement of my physician. Witness signature: ___________________________________________________ Date:  ______/______/_____                    Time:  ________ A. M.  P.M. Patient Name:  ______________________________________________________  (please print)      Patient signature:  ___________________________________________________             Relationship to Patient:           []  Parent    Responsible person                          []  Spouse  In case of minor or                    [] Other  _____________   Incompetent name:  __________________________________________________                               (please print)      _____________      Responsible person  In case of minor or  Incompetent signature:  _______________________________________________    Statement of Physician  My signature below affirms that prior to the time of the procedure, I have explained to the patient and/or his/her guardian, the risks and benefits involved in the proposed treatment and any reasonable alternative to the proposed treatment. I have also explained the risks and benefits involved in the refusal of the proposed treatment and have answered the patient's questions.                         Date:  ______/______/_______  Provider                      Signature:  __________________________________________________________       Time:  ___________ A.M    P.M.